# Patient Record
Sex: FEMALE | Race: WHITE | Employment: FULL TIME | URBAN - METROPOLITAN AREA
[De-identification: names, ages, dates, MRNs, and addresses within clinical notes are randomized per-mention and may not be internally consistent; named-entity substitution may affect disease eponyms.]

---

## 2017-10-28 ENCOUNTER — HOSPITAL ENCOUNTER (OUTPATIENT)
Dept: MAMMOGRAPHY | Age: 42
Discharge: HOME | End: 2017-10-28
Attending: OBSTETRICS & GYNECOLOGY
Payer: COMMERCIAL

## 2017-10-28 DIAGNOSIS — Z12.31 VISIT FOR SCREENING MAMMOGRAM: ICD-10-CM

## 2017-10-28 PROCEDURE — 77067 SCR MAMMO BI INCL CAD: CPT

## 2017-11-10 ENCOUNTER — DOCUMENTATION ONLY (OUTPATIENT)
Dept: SURGERY | Age: 42
End: 2017-11-10

## 2018-03-30 ENCOUNTER — HOSPITAL ENCOUNTER (OUTPATIENT)
Dept: GENERAL RADIOLOGY | Age: 43
Discharge: HOME | End: 2018-03-30
Payer: COMMERCIAL

## 2018-03-30 DIAGNOSIS — M25.521 PAIN IN RIGHT ELBOW: ICD-10-CM

## 2018-03-30 PROCEDURE — 73080 X-RAY EXAM OF ELBOW: CPT

## 2018-04-13 ENCOUNTER — OFFICE VISIT (OUTPATIENT)
Dept: ORTHOPEDIC SURGERY | Facility: CLINIC | Age: 43
End: 2018-04-13

## 2018-04-13 ENCOUNTER — APPOINTMENT (OUTPATIENT)
Dept: PHYSICAL THERAPY | Age: 43
End: 2018-04-13

## 2018-04-13 VITALS
OXYGEN SATURATION: 99 % | RESPIRATION RATE: 18 BRPM | HEIGHT: 60 IN | WEIGHT: 214.4 LBS | SYSTOLIC BLOOD PRESSURE: 115 MMHG | DIASTOLIC BLOOD PRESSURE: 73 MMHG | TEMPERATURE: 98.9 F | HEART RATE: 107 BPM | BODY MASS INDEX: 42.09 KG/M2

## 2018-04-13 DIAGNOSIS — M77.11 RIGHT LATERAL EPICONDYLITIS: Primary | ICD-10-CM

## 2018-04-13 DIAGNOSIS — M25.521 RIGHT ELBOW PAIN: ICD-10-CM

## 2018-04-13 PROBLEM — E66.01 OBESITY, MORBID (HCC): Status: ACTIVE | Noted: 2018-04-13

## 2018-04-13 RX ORDER — BUPIVACAINE HYDROCHLORIDE 2.5 MG/ML
4 INJECTION, SOLUTION EPIDURAL; INFILTRATION; INTRACAUDAL ONCE
Qty: 4 ML | Refills: 0
Start: 2018-04-13 | End: 2018-04-13

## 2018-04-13 RX ORDER — BETAMETHASONE SODIUM PHOSPHATE AND BETAMETHASONE ACETATE 3; 3 MG/ML; MG/ML
6 INJECTION, SUSPENSION INTRA-ARTICULAR; INTRALESIONAL; INTRAMUSCULAR; SOFT TISSUE ONCE
Qty: 0.5 ML | Refills: 0
Start: 2018-04-13 | End: 2018-04-13

## 2018-04-13 RX ORDER — LISINOPRIL AND HYDROCHLOROTHIAZIDE 20; 25 MG/1; MG/1
TABLET ORAL
Refills: 3 | COMMUNITY
Start: 2018-04-03

## 2018-04-13 NOTE — PATIENT INSTRUCTIONS
Tennis Elbow: Exercises  Your Care Instructions  Here are some examples of typical rehabilitation exercises for your condition. Start each exercise slowly. Ease off the exercise if you start to have pain. Your doctor or physical therapist will tell you when you can start these exercises and which ones will work best for you. How to do the exercises  Wrist flexor stretch    1. Extend your arm in front of you with your palm up. 2. Bend your wrist, pointing your hand toward the floor. 3. With your other hand, gently bend your wrist farther until you feel a mild to moderate stretch in your forearm. 4. Hold for at least 15 to 30 seconds. Repeat 2 to 4 times. Wrist extensor stretch    1. Repeat steps 1 to 4 of the stretch above but begin with your extended hand palm down. Ball or sock squeeze    1. Hold a tennis ball (or a rolled-up sock) in your hand. 2. Make a fist around the ball (or sock) and squeeze. 3. Hold for about 6 seconds, and then relax for up to 10 seconds. 4. Repeat 8 to 12 times. 5. Switch the ball (or sock) to your other hand and do 8 to 12 times. Wrist deviation    1. Sit so that your arm is supported but your hand hangs off the edge of a flat surface, such as a table. 2. Hold your hand out like you are shaking hands with someone. 3. Move your hand up and down. 4. Repeat this motion 8 to 12 times. 5. Switch arms. 6. Try to do this exercise twice with each hand. Wrist curls    1. Place your forearm on a table with your hand hanging over the edge of the table, palm up. 2. Place a 1- to 2-pound weight in your hand. This may be a dumbbell, a can of food, or a filled water bottle. 3. Slowly raise and lower the weight while keeping your forearm on the table and your palm facing up. 4. Repeat this motion 8 to 12 times. 5. Switch arms, and do steps 1 through 4.  6. Repeat with your hand facing down toward the floor. Switch arms. Biceps curls    1.  Sit leaning forward with your legs slightly spread and your left hand on your left thigh. 2. Place your right elbow on your right thigh, and hold the weight with your forearm horizontal.  3. Slowly curl the weight up and toward your chest.  4. Repeat this motion 8 to 12 times. 5. Switch arms, and do steps 1 through 4. Follow-up care is a key part of your treatment and safety. Be sure to make and go to all appointments, and call your doctor if you are having problems. It's also a good idea to know your test results and keep a list of the medicines you take. Where can you learn more? Go to http://linnea-luz.info/. Enter F324 in the search box to learn more about \"Tennis Elbow: Exercises. \"  Current as of: March 21, 2017  Content Version: 11.4  © 8458-3013 [employer]. Care instructions adapted under license by [employer] (which disclaims liability or warranty for this information). If you have questions about a medical condition or this instruction, always ask your healthcare professional. [place] [address] any warranty or liability for your use of this information. Joint Injections: Care Instructions  Your Care Instructions  Joint injections are shots into a joint, such as the knee. They may be used to put in medicines, such as pain relievers. Or they can be used to take out fluid. Sometimes the fluid is tested in a lab. This can help find the cause of a joint problem. A corticosteroid, or steroid, shot is used to reduce inflammation in tendons or joints. It is often used to treat problems such as arthritis, tendinitis, and bursitis. Steroids can be injected directly into a painful, inflamed joint. They can also help reduce inflammation of a bursa. A bursa is a sac of fluid. It cushions and lubricates areas where tendons, ligaments, skin, muscles, or bones rub against each other.   A steroid shot can sometimes help with short-term pain relief when other treatments haven't worked. If steroid shots help, pain may improve for weeks or months. Follow-up care is a key part of your treatment and safety. Be sure to make and go to all appointments, and call your doctor if you are having problems. It's also a good idea to know your test results and keep a list of the medicines you take. How can you care for yourself at home? · Put ice or a cold pack on the area for 10 to 20 minutes at a time. Put a thin cloth between the ice and your skin. · Take anti-inflammatory medicines to reduce pain, swelling, or inflammation. These include ibuprofen (Advil, Motrin) and naproxen (Aleve). Read and follow all instructions on the label. · Avoid strenuous activities for several days, especially those that put stress on the area where you got the shot. · If you have dressings over the area, keep them clean and dry. You may remove them when your doctor tells you to. When should you call for help? Call your doctor now or seek immediate medical care if:  ? · You have signs of infection, such as:  ¨ Increased pain, swelling, warmth, or redness. ¨ Red streaks leading from the site. ¨ Pus draining from the site. ¨ A fever. ? Watch closely for changes in your health, and be sure to contact your doctor if you have any problems. Where can you learn more? Go to http://linnea-luz.info/. Enter N616 in the search box to learn more about \"Joint Injections: Care Instructions. \"  Current as of: March 21, 2017  Content Version: 11.4  © 1319-0643 [employer]. Care instructions adapted under license by [employer] (which disclaims liability or warranty for this information). If you have questions about a medical condition or this instruction, always ask your healthcare professional. [address] any warranty or liability for your use of this information.

## 2018-04-13 NOTE — MR AVS SNAPSHOT
[address], [address] [place] [address] 
[phone] Patient: Krystin Cruz MRN: [id number] :1975 Visit Information Date & Time Provider Department Dept. Phone Encounter #  
 2018  2:15 PM Lexy Miranda, 27 [facility] [phone] [id number] Follow-up Instructions Return if symptoms worsen or fail to improve. Upcoming Health Maintenance Date Due DTaP/Tdap/Td series (1 - Tdap) 1998 PAP AKA CERVICAL CYTOLOGY 1998 Influenza Age 5 to Adult 2017 Allergies as of 2018  Review Complete On: 2018 By: Casey Lopez Severity Noted Reaction Type Reactions Zegerid [Omeprazole-sodium Bicarbonate] Medium 2017    Swelling Hands and feet Current Immunizations  Never Reviewed No immunizations on file. Not reviewed this visit You Were Diagnosed With   
  
 Codes Comments Right lateral epicondylitis    -  Primary ICD-10-CM: M77.11 ICD-9-CM: 726.32 Right elbow pain     ICD-10-CM: M25.521 ICD-9-CM: 719.42 BMI 40.0-44.9, adult [facility])     ICD-10-CM: Z68.41 
ICD-9-CM: V85.41 Vitals BP Pulse Temp Resp Height(growth percentile) Weight(growth percentile) 115/73 (!) 107 98.9 °F (37.2 °C) (Oral) 18 5' (1.524 m) 214 lb 6.4 oz (97.3 kg) SpO2 BMI Smoking Status 99% 41.87 kg/m2 Former Smoker BMI and BSA Data Body Mass Index Body Surface Area  
 41.87 kg/m 2 2.03 m 2 Preferred Pharmacy Pharmacy Name Phone [facility] [address] [phone] Your Updated Medication List  
  
   
This list is accurate as of 18  3:15 PM.  Always use your most recent med list.  
  
  
  
  
 albuterol 90 mcg/actuation inhaler Commonly known as:  PROVENTIL HFA, VENTOLIN HFA, PROAIR HFA  
 Take 2 Puffs by inhalation every four (4) hours as needed for Wheezing. * ibuprofen 800 mg tablet Commonly known as:  MOTRIN Take 800 mg by mouth every eight (8) hours as needed for Pain. * ibuprofen 800 mg tablet Commonly known as:  MOTRIN Take 1 Tab by mouth every eight (8) hours as needed for Pain. lisinopril-hydroCHLOROthiazide 20-25 mg per tablet Commonly known as:  Sigifredo Pelt TK 1 T PO QD  
  
 meclizine 25 mg tablet Commonly known as:  ANTIVERT Take 25 mg by mouth three (3) times daily as needed. meloxicam 15 mg tablet Commonly known as:  MOBIC Take 15 mg by mouth nightly. oxyCODONE-acetaminophen 5-325 mg per tablet Commonly known as:  PERCOCET Take 1 Tab by mouth every four (4) hours as needed for Pain. Max Daily Amount: 6 Tabs. phentermine 30 mg capsule Take 30 mg by mouth every morning. SINGULAIR 10 mg tablet Generic drug:  montelukast  
Take 10 mg by mouth nightly. TOPAMAX 50 mg tablet Generic drug:  topiramate Take 50 mg by mouth daily. * Notice: This list has 2 medication(s) that are the same as other medications prescribed for you. Read the directions carefully, and ask your doctor or other care provider to review them with you. Follow-up Instructions Return if symptoms worsen or fail to improve. To-Do List   
 04/20/2018 3:00 PM  
  Appointment with Richard Arceo PT at [facility] [address] [place] [address] ([phone]) Patient Instructions Tennis Elbow: Exercises Your Care Instructions Here are some examples of typical rehabilitation exercises for your condition. Start each exercise slowly. Ease off the exercise if you start to have pain. Your doctor or physical therapist will tell you when you can start these exercises and which ones will work best for you. How to do the exercises Wrist flexor stretch 1. Extend your arm in front of you with your palm up. 2. Bend your wrist, pointing your hand toward the floor. 3. With your other hand, gently bend your wrist farther until you feel a mild to moderate stretch in your forearm. 4. Hold for at least 15 to 30 seconds. Repeat 2 to 4 times. Wrist extensor stretch 1. Repeat steps 1 to 4 of the stretch above but begin with your extended hand palm down. Ball or sock squeeze 1. Hold a tennis ball (or a rolled-up sock) in your hand. 2. Make a fist around the ball (or sock) and squeeze. 3. Hold for about 6 seconds, and then relax for up to 10 seconds. 4. Repeat 8 to 12 times. 5. Switch the ball (or sock) to your other hand and do 8 to 12 times. Wrist deviation 1. Sit so that your arm is supported but your hand hangs off the edge of a flat surface, such as a table. 2. Hold your hand out like you are shaking hands with someone. 3. Move your hand up and down. 4. Repeat this motion 8 to 12 times. 5. Switch arms. 6. Try to do this exercise twice with each hand. Wrist curls 1. Place your forearm on a table with your hand hanging over the edge of the table, palm up. 2. Place a 1- to 2-pound weight in your hand. This may be a dumbbell, a can of food, or a filled water bottle. 3. Slowly raise and lower the weight while keeping your forearm on the table and your palm facing up. 4. Repeat this motion 8 to 12 times. 5. Switch arms, and do steps 1 through 4. 
6. Repeat with your hand facing down toward the floor. Switch arms. Biceps curls 1. Sit leaning forward with your legs slightly spread and your left hand on your left thigh. 2. Place your right elbow on your right thigh, and hold the weight with your forearm horizontal. 
3. Slowly curl the weight up and toward your chest. 
4. Repeat this motion 8 to 12 times. 5. Switch arms, and do steps 1 through 4. Follow-up care is a key part of your treatment and safety.  Be sure to make and go to all appointments, and call your doctor if you are having problems. It's also a good idea to know your test results and keep a list of the medicines you take. Where can you learn more? Go to http://linnea-luz.info/. Enter O626 in the search box to learn more about \"Tennis Elbow: Exercises. \" Current as of: March 21, 2017 Content Version: 11.4 © 3091-2957 [employer]. Care instructions adapted under license by [employer] (which disclaims liability or warranty for this information). If you have questions about a medical condition or this instruction, always ask your healthcare professional. [place] [address] any warranty or liability for your use of this information. Joint Injections: Care Instructions Your Care Instructions Joint injections are shots into a joint, such as the knee. They may be used to put in medicines, such as pain relievers. Or they can be used to take out fluid. Sometimes the fluid is tested in a lab. This can help find the cause of a joint problem. A corticosteroid, or steroid, shot is used to reduce inflammation in tendons or joints. It is often used to treat problems such as arthritis, tendinitis, and bursitis. Steroids can be injected directly into a painful, inflamed joint. They can also help reduce inflammation of a bursa. A bursa is a sac of fluid. It cushions and lubricates areas where tendons, ligaments, skin, muscles, or bones rub against each other. A steroid shot can sometimes help with short-term pain relief when other treatments haven't worked. If steroid shots help, pain may improve for weeks or months. Follow-up care is a key part of your treatment and safety. Be sure to make and go to all appointments, and call your doctor if you are having problems. It's also a good idea to know your test results and keep a list of the medicines you take. How can you care for yourself at home? · Put ice or a cold pack on the area for 10 to 20 minutes at a time.  Put a thin cloth between the ice and your skin. · Take anti-inflammatory medicines to reduce pain, swelling, or inflammation. These include ibuprofen (Advil, Motrin) and naproxen (Aleve). Read and follow all instructions on the label. · Avoid strenuous activities for several days, especially those that put stress on the area where you got the shot. · If you have dressings over the area, keep them clean and dry. You may remove them when your doctor tells you to. When should you call for help? Call your doctor now or seek immediate medical care if: 
? · You have signs of infection, such as: 
¨ Increased pain, swelling, warmth, or redness. ¨ Red streaks leading from the site. ¨ Pus draining from the site. ¨ A fever. ? Watch closely for changes in your health, and be sure to contact your doctor if you have any problems. Where can you learn more? Go to http://linnea-luz.info/. Enter N616 in the search box to learn more about \"Joint Injections: Care Instructions. \" Current as of: March 21, 2017 Content Version: 11.4 © 0801-9447 [employer]. Care instructions adapted under license by [employer] (which disclaims liability or warranty for this information). If you have questions about a medical condition or this instruction, always ask your healthcare professional. [address] any warranty or liability for your use of this information. Introducing [facility]! Dear Elisabeth Kebeed: 
Thank you for requesting a [employer] account. Our records indicate that you already have an active [employer] account. You can access your account anytime at [email or web address] Did you know that you can access your hospital and ER discharge instructions at any time in [employer]? You can also review all of your test results from your hospital stay or ER visit. Additional Information If you have questions, please visit the Frequently Asked Questions section of the [employer] [facility] [employer] website at [email or web address] Remember, [employer] is NOT to be used for urgent needs. For medical emergencies, dial 911. Now available from your iPhone and Android! Please provide this summary of care documentation to your next provider. Your primary care clinician is listed as DEV SORIA. If you have any questions after today's visit, please call [phone].

## 2018-04-13 NOTE — PROGRESS NOTES
Patient: Nilesh Finnegan                MRN: [id number]       SSN: [id number]  YOB: 1975        AGE: 38 y.o. SEX: female    PCP: Jerardo Barcenas MD  04/13/18    Chief Complaint   Patient presents with    Elbow Pain     Right     HISTORY:  Nilesh Finnegan is a 38 y.o. female who is seen for right elbow pain. She reports pain for the past month with no history of injury. She states her pain comes and goes. She notes at time she has shooting pains. She reports that she aggravated her condition about a month and a half ago when she caught herself from falling on the ice while walking into work. She states that she has pain when pronating her arm to type at a keyboard. Pain Assessment  4/13/2018   Location of Pain Knee   Location Modifiers Right   Severity of Pain 9   Quality of Pain Dull;Aching; Leveda Landau; Throbbing   Duration of Pain Persistent   Frequency of Pain Constant   Aggravating Factors Bending;Stretching;Straightening   Limiting Behavior Yes   Relieving Factors Other (Comment); Heat   Relieving Factors Comment massage   Result of Injury No     Occupation, etc:  Ms. 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 She lives in [place] with her 5year old daughter, adult friend that watches her daughter, and her two adult children- a 45year old son and 55year old duaghter. Current weight is 214 pounds. She is 5' tall. She is not diabetic. She is hypertensive. No results found for: HBA1C, HGBE8, PJP5PMSQ, DCU2ZQUR, NOT2OKLJ  Weight Metrics 4/13/2018 11/14/2017 11/9/2017 9/6/2015 9/5/2015   Weight 214 lb 6.4 oz 200 lb 9.9 oz 203 lb - 220 lb   BMI 41.87 kg/m2 38.22 kg/m2 38.67 kg/m2 41.59 kg/m2 -       There is no problem list on file for this patient. REVIEW OF SYSTEMS: All Below are Negative except: See HPI   Constitutional: negative for fever, chills, and weight loss.    Cardiovascular: negative for chest pain, claudication, leg swelling, SOB, VAZQUEZ   Gastrointestinal: Negative for pain, N/V/C/D, Blood in stool or urine, dysuria,  hematuria, incontinence, pelvic pain. Musculoskeletal: See HPI   Neurological: Negative for dizziness and weakness. Negative for headaches, Visual changes, confusion, seizures   Phychiatric/Behavioral: Negative for depression, memory loss, substance  abuse. Extremities: Negative for hair changes, rash, or skin lesion changes. Hematologic: Negative for bleeding problems, bruising, pallor or swollen lymph  nodes   Peripheral Vascular: No calf pain, no circulation deficits. Social History     Social History    Marital status: LEGALLY      Spouse name: N/A    Number of children: N/A    Years of education: N/A     Occupational History    Not on file. Social History Main Topics    Smoking status: Former Smoker     Quit date: 11/9/2003    Smokeless tobacco: Never Used    Alcohol use Yes      Comment: socially    Drug use: No    Sexual activity: Not on file     Other Topics Concern    Not on file     Social History Narrative      Allergies   Allergen Reactions    Zegerid [Omeprazole-Sodium Bicarbonate] Swelling     Hands and feet      Current Outpatient Prescriptions   Medication Sig    lisinopril-hydroCHLOROthiazide (PRINZIDE, ZESTORETIC) 20-25 mg per tablet TK 1 T PO QD    montelukast (SINGULAIR) 10 mg tablet Take 10 mg by mouth nightly.  meloxicam (MOBIC) 15 mg tablet Take 15 mg by mouth nightly.  oxyCODONE-acetaminophen (PERCOCET) 5-325 mg per tablet Take 1 Tab by mouth every four (4) hours as needed for Pain. Max Daily Amount: 6 Tabs.  ibuprofen (MOTRIN) 800 mg tablet Take 1 Tab by mouth every eight (8) hours as needed for Pain.  topiramate (TOPAMAX) 50 mg tablet Take 50 mg by mouth daily.  phentermine 30 mg capsule Take 30 mg by mouth every morning.  ibuprofen (MOTRIN) 800 mg tablet Take 800 mg by mouth every eight (8) hours as needed for Pain.     meclizine (ANTIVERT) 25 mg tablet Take 25 mg by mouth three (3) times daily as needed.  albuterol (PROVENTIL HFA, VENTOLIN HFA, PROAIR HFA) 90 mcg/actuation inhaler Take 2 Puffs by inhalation every four (4) hours as needed for Wheezing. No current facility-administered medications for this visit. PHYSICAL EXAMINATION:  Visit Vitals    /73    Pulse (!) 107    Temp 98.9 °F (37.2 °C) (Oral)    Resp 18    Ht 5' (1.524 m)    Wt 214 lb 6.4 oz (97.3 kg)    SpO2 99%    BMI 41.87 kg/m2      ORTHO EXAMINATION:  Examination Right Elbow Left Elbow   Skin Intact Intact   Range of Motion 130-0 135-0   Tenderness + lateral epicondyle -   Swelling - -   Bruising - -   Stability Normal Normal   Motor Strength  Normal Normal   Neurovascular Intact Intact   80/80 pronation/supination    PROCEDURE:  After discussing treatment options, patient's right lateral epicondyle region was injected with 4 cc Marcaine and 1/2 cc Celestone. Chart reviewed for the following:   Tami Crowe MD, have reviewed the History, Physical and updated the Allergic reactions for [address] [place] [address] performed immediately prior to start of procedure:  Tami Crowe MD, have performed the following reviews on Ligia Mcarthur prior to the start of the procedure:            * Patient was identified by name and date of birth   * Agreement on procedure being performed was verified  * Risks and Benefits explained to the patient  * Procedure site verified and marked as necessary  * Patient was positioned for comfort  * Consent was obtained     Time: 3:11 PM     Date of procedure: 4/13/2018    Procedure performed by:  Luis Michaels MD    Ms. Princess Contreras tolerated the procedure well with no complications. RADIOGRAPHS:  XR RIGHT ELBOW 3/30/18  IMPRESSION:   No acute findings. Tiny coronoid process spur. IMPRESSION:      ICD-10-CM ICD-9-CM    1.  Right lateral epicondylitis M77.11 726.32 betamethasone (CELESTONE SOLUSPAN) 6 mg/mL injection BETAMETHASONE ACETATE & SODIUM PHOSPHATE INJECTION 3 MG EA.      bupivacaine, PF, (MARCAINE, PF,) 0.25 % (2.5 mg/mL) injection      OK INJECT TENDON SHEATH/LIGAMENT      AMB SUPPLY ORDER      REFERRAL TO OCCUPATIONAL THERAPY   2. Right elbow pain M25.521 719.42 betamethasone (CELESTONE SOLUSPAN) 6 mg/mL injection      BETAMETHASONE ACETATE & SODIUM PHOSPHATE INJECTION 3 MG EA.      bupivacaine, PF, (MARCAINE, PF,) 0.25 % (2.5 mg/mL) injection      OK INJECT TENDON SHEATH/LIGAMENT      AMB SUPPLY ORDER      REFERRAL TO OCCUPATIONAL THERAPY   3. BMI 40.0-44.9, adult ([facility] [address] Z68.41 V85.41      PLAN:  After discussing treatment options, patient's right lateral epicondylar region was injected with 4 cc Marcaine and 1/2 cc Celestone. Gloria Lemus She will follow up as needed. She was provided with a tennis elbow strap today. She will begin a short course of outpatient physical therapy for her right elbow.      Scribed by Zenaida Givens ([address]) as dictated by Chad Gutiérrez MD

## 2018-04-20 ENCOUNTER — HOSPITAL ENCOUNTER (OUTPATIENT)
Dept: PHYSICAL THERAPY | Age: 43
End: 2018-04-20

## 2018-04-25 ENCOUNTER — APPOINTMENT (OUTPATIENT)
Dept: PHYSICAL THERAPY | Age: 43
End: 2018-04-25

## 2018-05-03 ENCOUNTER — OFFICE VISIT (OUTPATIENT)
Dept: CARDIOLOGY CLINIC | Age: 43
End: 2018-05-03

## 2018-05-03 VITALS
SYSTOLIC BLOOD PRESSURE: 118 MMHG | BODY MASS INDEX: 42.6 KG/M2 | HEIGHT: 60 IN | HEART RATE: 91 BPM | OXYGEN SATURATION: 98 % | DIASTOLIC BLOOD PRESSURE: 78 MMHG | WEIGHT: 217 LBS

## 2018-05-03 DIAGNOSIS — R06.02 SOB (SHORTNESS OF BREATH): ICD-10-CM

## 2018-05-03 DIAGNOSIS — E66.01 OBESITY, MORBID (HCC): ICD-10-CM

## 2018-05-03 DIAGNOSIS — R00.2 PALPITATIONS: Primary | ICD-10-CM

## 2018-05-03 DIAGNOSIS — I10 ESSENTIAL HYPERTENSION: ICD-10-CM

## 2018-05-03 RX ORDER — PREDNISONE 5 MG/1
TABLET ORAL SEE ADMIN INSTRUCTIONS
COMMUNITY

## 2018-05-03 NOTE — PROGRESS NOTES
HISTORY OF PRESENT ILLNESS  Louis Wilks is a 38 y.o. female. HPI    Patient presents for a new office visit. She was referred here by her PCP for evaluation of heart palpitations. The patient has a history of essential hypertension which was recently diagnosed. She states she was started on blood pressure medication less than 2 months ago. She also has a history of intermittent asthma and obesity. She reports a gradual 20-30 pound weight gain over the past several years. The patient states her heart palpitations started approximately 5-6 months ago. She initially thought this was related to phentermine, which she has started for weight loss. However she stopped taking the medications and her palpitations did not improve. She states the symptoms occur daily lasting for anywhere from a few minutes to 20 minutes in duration. Feels like her heart is racing and skipping beats. He has checked her heart rate on her phone and it read 115 bpm.  She denies any associated syncope or near syncope. He also has noted exertional dyspnea over the past year which has not particularly worsened. She did have leg swelling, but this improved when she started taking a thiazide diuretic for blood pressure. No orthopnea, no PND. No chest pain or pressure. No major change in her activity tolerance. Past Medical History:   Diagnosis Date    Abdominal cramping, periumbilical     Back pain     Bilateral plantar fasciitis     Endometriosis     Hypertension 2018    Morbid obesity (HCC)     Vertigo      Current Outpatient Prescriptions   Medication Sig Dispense Refill    predniSONE (STERAPRED) 5 mg dose pack Take  by mouth See Admin Instructions. See administration instruction per 5mg dose pack      lisinopril-hydroCHLOROthiazide (PRINZIDE, ZESTORETIC) 20-25 mg per tablet TK 1 T PO QD  3    ibuprofen (MOTRIN) 800 mg tablet Take 1 Tab by mouth every eight (8) hours as needed for Pain.  60 Tab 0    montelukast (SINGULAIR) 10 mg tablet Take 10 mg by mouth nightly.  meclizine (ANTIVERT) 25 mg tablet Take 25 mg by mouth three (3) times daily as needed.  albuterol (PROVENTIL HFA, VENTOLIN HFA, PROAIR HFA) 90 mcg/actuation inhaler Take 2 Puffs by inhalation every four (4) hours as needed for Wheezing. Allergies   Allergen Reactions    Zegerid [Omeprazole-Sodium Bicarbonate] Swelling     Hands and feet      Social History   Substance Use Topics    Smoking status: Former Smoker     Quit date: 11/9/2003    Smokeless tobacco: Never Used    Alcohol use Yes      Comment: socially     Family History   Problem Relation Age of Onset    Breast Cancer Sister 28    Cancer Sister      breast and uterine    Diabetes Mother     Hypertension Mother     Cancer Maternal Uncle      lung and bone     Cancer Maternal Grandmother      breast    Cancer Cousin      breast         Review of Systems   Constitutional: Negative for chills, fever and weight loss. HENT: Negative for nosebleeds. Eyes: Negative for blurred vision and double vision. Respiratory: Positive for shortness of breath. Negative for cough and wheezing. Cardiovascular: Positive for palpitations. Negative for chest pain, orthopnea, claudication, leg swelling and PND. Gastrointestinal: Negative for abdominal pain, heartburn, nausea and vomiting. Genitourinary: Negative for dysuria and hematuria. Musculoskeletal: Negative for falls and myalgias. Skin: Negative for rash. Neurological: Negative for dizziness, focal weakness and headaches. Endo/Heme/Allergies: Does not bruise/bleed easily. Psychiatric/Behavioral: Negative for substance abuse. Visit Vitals    /78    Pulse 91    Ht 5' (1.524 m)    Wt 98.4 kg (217 lb)    SpO2 98%    BMI 42.38 kg/m2       Physical Exam   Constitutional: She is oriented to person, place, and time. She appears well-developed and well-nourished. HENT:   Head: Normocephalic and atraumatic.    Eyes: Conjunctivae are normal.   Neck: Neck supple. No JVD present. Carotid bruit is not present. Cardiovascular: Normal rate, regular rhythm, S1 normal, S2 normal and normal pulses. Exam reveals no gallop and no friction rub. No murmur heard. Pulmonary/Chest: Effort normal and breath sounds normal. She has no wheezes. She has no rales. Abdominal: Soft. Bowel sounds are normal. There is no tenderness. Musculoskeletal: She exhibits no edema, tenderness or deformity. Neurological: She is alert and oriented to person, place, and time. Skin: Skin is warm and dry. Psychiatric: She has a normal mood and affect. Her behavior is normal. Thought content normal.     EKG: Normal sinus rhythm, normal axis, normal QTc interval, no ST/T wave abnormalities concerning for ischemia. ASSESSMENT and PLAN  Encounter Diagnoses   Name Primary?  Palpitations Yes    SOB (shortness of breath)     Essential hypertension     Obesity, morbid (HCC)      Heart palpitations. Unclear etiology at this point. Since her symptoms occurring daily, I recommended a 48 hour Holter monitor for further evaluation. Would also like to rule out any structural heart disease with an echocardiogram.    Dyspnea on exertion. My suspicion  present this is all related to her body habitus and deconditioning. However, a cardiac etiology will be evaluated for with an echocardiogram.    Essential hypertension. This was recently diagnosed earlier this year. He is now taking lisinopril/HCTZ and her blood pressure appears to be adequately controlled on this regimen. Morbid obesity. Patient's weight has gradually increased over the past 2-3 years. She was encouraged to try lose as much weight as possible with lifestyle modification.     Follow-up in 2-3 months, sooner if needed

## 2018-05-03 NOTE — MR AVS SNAPSHOT
[address] [address] [address] [address] [phone] Patient: Arnulfo Madera MRN: [id number] :1975 Visit Information Date & Time Provider Department Dept. Phone Encounter #  
 5/3/2018 10:00 AM Lauryn Arnold MD [facility] [id number] Follow-up Instructions Return in about 3 months (around 8/3/2018). Upcoming Health Maintenance Date Due DTaP/Tdap/Td series (1 - Tdap) 1998 PAP AKA CERVICAL CYTOLOGY 1998 Influenza Age 5 to Adult 2018 Allergies as of 5/3/2018  Review Complete On: 2018 By: Conchita Clark MD  
  
 Severity Noted Reaction Type Reactions Zegerid [Omeprazole-sodium Bicarbonate] Medium 2017    Swelling Hands and feet Current Immunizations  Never Reviewed No immunizations on file. Not reviewed this visit You Were Diagnosed With   
  
 Codes Comments Palpitations    -  Primary ICD-10-CM: R00.2 ICD-9-CM: 785.1 SOB (shortness of breath)     ICD-10-CM: R06.02 
ICD-9-CM: 786.05 Vitals BP Pulse Height(growth percentile) Weight(growth percentile) SpO2 BMI  
 118/78 91 5' (1.524 m) 217 lb (98.4 kg) 98% 42.38 kg/m2 Smoking Status Former Smoker Vitals History BMI and BSA Data Body Mass Index Body Surface Area  
 42.38 kg/m 2 2.04 m 2 Preferred Pharmacy Pharmacy Name Phone [facility] [address] [place] [address] [phone] Your Updated Medication List  
  
   
This list is accurate as of 5/3/18 10:55 AM.  Always use your most recent med list.  
  
  
  
  
 albuterol 90 mcg/actuation inhaler Commonly known as:  PROVENTIL HFA, VENTOLIN HFA, PROAIR HFA Take 2 Puffs by inhalation every four (4) hours as needed for Wheezing. ibuprofen 800 mg tablet Commonly known as:  MOTRIN Take 1 Tab by mouth every eight (8) hours as needed for Pain. lisinopril-hydroCHLOROthiazide 20-25 mg per tablet Commonly known as:  Terryl Range TK 1 T PO QD  
  
 meclizine 25 mg tablet Commonly known as:  ANTIVERT Take 25 mg by mouth three (3) times daily as needed. predniSONE 5 mg dose pack Commonly known as:  STERAPRED Take  by mouth See Admin Instructions. See administration instruction per 5mg dose pack SINGULAIR 10 mg tablet Generic drug:  montelukast  
Take 10 mg by mouth nightly. We Performed the Following AMB POC EKG ROUTINE W/ 12 LEADS, INTER & REP [09338 CPT(R)] Follow-up Instructions Return in about 3 months (around 8/3/2018). To-Do List   
 05/03/2018 ECHO:  2D ECHO COMPLETE ADULT (TTE) W OR WO CONTR   
  
 05/03/2018 ECG:  ECG HOLTER MONITOR, UP TO 48 HRS Introducing [facility]! Dear Rocio Ashley: 
Thank you for requesting a [employer] account. Our records indicate that you already have an active [employer] account. You can access your account anytime at [email or web address] Did you know that you can access your hospital and ER discharge instructions at any time in [employer]? You can also review all of your test results from your hospital stay or ER visit. Additional Information If you have questions, please visit the Frequently Asked Questions section of the [employer] website at [email or web address] Remember, [employer] is NOT to be used for urgent needs. For medical emergencies, dial 911. Now available from your iPhone and Android! Please provide this summary of care documentation to your next provider. Your primary care clinician is listed as DEV SORIA. If you have any questions after today's visit, please call [phone].

## 2018-05-16 ENCOUNTER — HOSPITAL ENCOUNTER (OUTPATIENT)
Dept: NON INVASIVE DIAGNOSTICS | Age: 43
Discharge: HOME | End: 2018-05-16
Attending: INTERNAL MEDICINE
Payer: COMMERCIAL

## 2018-05-16 DIAGNOSIS — R00.2 PALPITATIONS: ICD-10-CM

## 2018-05-16 DIAGNOSIS — R06.02 SOB (SHORTNESS OF BREATH): ICD-10-CM

## 2018-05-16 PROCEDURE — 93306 TTE W/DOPPLER COMPLETE: CPT

## 2018-05-16 PROCEDURE — 93225 XTRNL ECG REC<48 HRS REC: CPT | Performed by: INTERNAL MEDICINE

## 2018-05-17 NOTE — PROGRESS NOTES
Per your last note \" Dyspnea on exertion. My suspicion  present this is all related to her body habitus and deconditioning.   However, a cardiac etiology will be evaluated for with an echocardiogram. Waiting on holter results

## 2018-05-25 ENCOUNTER — TELEPHONE (OUTPATIENT)
Dept: ORTHOPEDIC SURGERY | Age: 43
End: 2018-05-25

## 2018-05-25 ENCOUNTER — DOCUMENTATION ONLY (OUTPATIENT)
Dept: ORTHOPEDIC SURGERY | Facility: CLINIC | Age: 43
End: 2018-05-25

## 2018-05-25 DIAGNOSIS — M25.521 RIGHT ELBOW PAIN: Primary | ICD-10-CM

## 2018-05-25 DIAGNOSIS — M77.11 RIGHT LATERAL EPICONDYLITIS: ICD-10-CM

## 2018-05-25 NOTE — TELEPHONE ENCOUNTER
Patient is calling requesting Dr. Opal Leary re-order O. T.as patient was unable to start when Lynita Standard was entered due to work schedule (works for [employer]) Please refax OT order to [facility]. [facility] fax [phone] so they can call her to schedule appts. Please call patient at [phone]. Also patient needs a letter from Dr. Opal Leary ref her mother, Mrs. Jv Bhatia 10/20/1952, that Mrs. Connie Alex is not able to take care of her self and need her daughter Mrs. Rajput to be off to take care of her mother's personal needs and appts. Pateint needs letter for her HR as soon as possible. Please call Mrs. Ave Mckinley at [phone]

## 2018-05-25 NOTE — PROGRESS NOTES
Patient dropped off FMLA paperwork to be completed . She stated that she is needing [facility] to care for her mother. Please advise patient at [phone] when ready for pick-up.

## 2018-05-25 NOTE — TELEPHONE ENCOUNTER
OK per FLAKITO Jones to re-order OT--order written--called pt --advised her we need FMLA form for care of family member--she already had and will drop by HS location--she needs for intermittent leave

## 2018-05-29 ENCOUNTER — DOCUMENTATION ONLY (OUTPATIENT)
Dept: ORTHOPEDIC SURGERY | Facility: CLINIC | Age: 43
End: 2018-05-29

## 2018-06-04 NOTE — PROGRESS NOTES
Per your last note \" Heart palpitations. Unclear etiology at this point. Since her symptoms occurring daily, I recommended a 48 hour Holter monitor for further evaluation.   Would also like to rule out any structural heart disease with an echocardiogram.

## 2018-06-05 ENCOUNTER — TELEPHONE (OUTPATIENT)
Dept: CARDIOLOGY CLINIC | Age: 43
End: 2018-06-05

## 2018-06-05 NOTE — TELEPHONE ENCOUNTER
Call and left message for patient to give office a call to inform patient of Holter monitor results.

## 2018-06-05 NOTE — TELEPHONE ENCOUNTER
----- Message from Munir Cohn MD sent at 6/4/2018  2:20 PM EDT -----  Please let the patient know that her Holter monitor was unremarkable.  ----- Message -----     From: Dayana Driver RN     Sent: 6/4/2018   8:56 AM       To: Munir Cohn MD    Per your last note \" Heart palpitations. Unclear etiology at this point. Since her symptoms occurring daily, I recommended a 48 hour Holter monitor for further evaluation.   Would also like to rule out any structural heart disease with an echocardiogram.

## 2018-06-06 ENCOUNTER — HOSPITAL ENCOUNTER (OUTPATIENT)
Dept: PHYSICAL THERAPY | Age: 43
Discharge: HOME | End: 2018-06-06
Payer: COMMERCIAL

## 2018-06-06 PROCEDURE — 97110 THERAPEUTIC EXERCISES: CPT

## 2018-06-06 PROCEDURE — 97165 OT EVAL LOW COMPLEX 30 MIN: CPT

## 2018-06-06 NOTE — PROGRESS NOTES
OT DAILY TREATMENT NOTE  3-    Patient Name: Stephanie Trevino  Date:2018  : 1975  [x]  Patient  Verified  Payor: Caleb Rather / Plan: [place] [employer] / Product Type: HMO /    In time: 56 Out time:*400  Total Treatment Time (min): 50  Visit #: 1 of 10    Treatment Area: Pain in right elbow [M25.521]  Lateral epicondylitis, right elbow [M77.11]    SUBJECTIVE  Pain Level (0-10 scale): 510  Any medication changes, allergies to medications, adverse drug reactions, diagnosis change, or new procedure performed?: [x] No    [] Yes (see summary sheet for update)  Subjective functional status/changes:   [] No changes reported  Felt better after injection for a few months    OBJECTIVE    Modality rationale: decrease pain and increase tissue extensibility to improve the patients ability to flex extend elbow without pain   Min Type Additional Details    [] Estim:  []Unatt       []IFC  []Premod                        []Other:  []w/ice   []w/heat  Position:  Location:    [] Estim: []Att    []TENS instruct  []NMES                    []Other:  []w/US   []w/ice   []w/heat  Position:  Location:    []  Traction: [] Cervical       []Lumbar                       [] Prone          []Supine                       []Intermittent   []Continuous Lbs:  [] before manual  [] after manual    []  Ultrasound: []Continuous   [] Pulsed                           []1MHz   []3MHz W/cm2:  Location:    []  Iontophoresis with dexamethasone         Location: [] Take home patch   [] In clinic   5 []  Ice     [x]  heat  []  Ice massage  []  Laser   []  Anodyne Position:  Location:elbow    []  Laser with stim  []  Other:  Position:  Location:    []  Vasopneumatic Device Pressure:       [] lo [] med [] hi   Temperature: [] lo [] med [] hi   [x] Skin assessment post-treatment:  []intact []redness- no adverse reaction    []redness  adverse reaction:     10 min Therapeutic Exercise:  [] See flow sheet :   Rationale: increase ROM to improve the patients ability to move arm freely  Review of phase Ii 10 reps each    5 min Manual Therapy:  Massage   Rationale: decrease pain to improve ROM  Review of phase I massage  *    With   [] TE   [] TA   [] neuro   [] other: Patient Education: [x] Review HEP    [] Progressed/Changed HEP based on: Phase II  [] positioning   [] body mechanics   [] transfers   [] heat/ice application   [x] Splint wear/care   [] Sensory re-education   [] scar management      [] other:             Other Objective/Functional Measures:   Subjective: pt is a right hand dominant, 42 y.o.y/o, female who sustained his/her injury 3/12/18 Injection April 13 with relief x 2 months   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        Objective:    Range of Motion:WNL  Strength:Pain with resistive wrist extension    Right Left   Shoulder Flex      Ext 3+ 5    abd      Horizontal add      IR      ER     Elbow Ext/flex     Forearm Supination      Pronation     Wrist Flex      Ext pain 5    Ulnar Dev      Radial Dev       Hand ROM WNL  Hand Strength:   Gross Grasp 3pt Pinch Lateral Pinch Tip Pinch   Right  30  10 strss 15 11 10   Left 60  55 stress 18 16 10       Finger Opposition:WNL      Palpation: Pain forearm 3 in distal to crease to 3 inches above elbow on triceps    ADLs I with pain and difficulty lifting opening etc  Light Meal Prep:    []MaxA   []ModA   []Alisson   [] CGA   []SBA   []Sangeeta   []Independent  Household/Other: []MaxA   []ModA   []Alisson   [] CGA   []SBA   []Sangeeta   []Independent    [] CGA   []SBA   []Sangeeta   []Independent       Pain Level (0-10 scale) post treatment: 4/10    ASSESSMENT/Changes in Function: *   [x]  See Plan of Care  []  See progress note/recertification  []  See Discharge Summary             PLAN  []  Upgrade activities as tolerated     []  Continue plan of care  []  Update interventions per flow sheet       []  Discharge due to:_  []  Other:_      Ly Villegas OTR/L 6/6/2018  3:02 PM    Future Appointments  Date Time Provider Susan Waller   7/27/2018 3:20 PM Narcisa Perez MD [address] [place] [address]

## 2018-06-06 NOTE — PROGRESS NOTES
[facility] [place] [employer]  [place]  [address] [place] [address]  [phone] fax    Plan of Care/Statement of Necessity for Occupational Therapy Services    Patient name: Tammy Mir Start of Care: 2018   Referral source: Sherie Martinez MD : 1975    Medical Diagnosis: Pain in right elbow [M25.521]  Lateral epicondylitis, right elbow [M77.11]   Onset Date:3/12/18    Treatment Diagnosis: Pain right elbow   Prior Hospitalization: see medical history Provider#: [id number]   Medications: Verified on Patient summary List    Comorbidities: Anxiety, HTN, prior right elbow injury   Prior Level of Function: I self care home care driving, working out, family activities with 4 children, works at [employer] [address] [employer] and following information is based on the information from the initial evaluation. Assessment/ key information: 38year old female who injured right elbow in near fall onice in [place]. She had injection with relief for 2 months, but pain has returned 5/10 today. She has full AROM in elbow and wrist, but limited  of 30# in standard position and 10# in stress posiiton. She has difficulty lifting carrying and opening containers. She is tender to palpation from 3 inch distal to elbow to 3 inches above elbow on triceps. Her FOTO is 44/100 reflecting severe impairment in function. She is currently being treated for hypertension and has had problems with anxiety and panic attacks. She is under considerable pressure due to family illness. She will benefit from skilled occupational therapy to improve right upper extremity strength and decrease pain.       Evaluation Complexity: History LOW Complexity : Brief history review  Examination LOW Complexity : 1-3 performance deficits relating to physical, cognitive , or psychosocial skils that result in activity limitations and / or participation restrictions  Clinical Decision Making LOW Complexity : No comorbidities that affect functional and no verbal or physical assistance needed to complete eval tasks   Overall Complexity Rating: LOW     Problem List: Pain effecting function, Decreased strength, Decreased ADL/functional abilities  and Decreased activity tolerance     Treatment Plan may include any combination of the following: Therapeutic exercise, Therapeutic activities, Neuromuscular re-education, Manual therapy, Splinting/orthoses, Patient education and ADLs/IADLs    Patient / Family readiness to learn indicated by: asking questions, trying to perform skills and interest    Persons(s) to be included in education:   patient (P)    Barriers to Learning/Limitations: None    Patient Goal (s): Help decrease stiffening in shoulder and neck from protecting elbow    Patient Self Reported Health Status: fair    Rehabilitation Potential: good    Short Term Goals: To be accomplished in 2 weeks:  1. Patient will be independent in home strategies for pain relief including heat and massage. 2.  Patient will be independent in stretching exercises to improve pain free ROM. 3.  Patient will be independent in scapula stabilization and posture exercises to decrease risk of recurrence. Long Term Goals: To be accomplished in 10 treatments:   1. Patient will improve right hand  in standard and stress position by at least 30# for increased ability to lift pans and groceries. 2.  Patient will  report pain 0-2/10 with routine use for work and home care tasks. 3.  Patient will report improved function in home care tasks such as cooking and sweeping as shown by increase in FOTO of at least 15 points.         Frequency / Duration: Patient to be seen 2-3 times per week for 10 treatments:    Patient/ Caregiver education and instruction: Diagnosis, prognosis, self care, activity modification, brace/ splint application and exercises   [x]  Plan of care has been reviewed with HERB Scott/ANG 6/6/2018 6:37 PM    _____________________________________________________________________    I certify that the above Therapy Services are being furnished while the patient is under my care. I agree with the treatment plan and certify that this therapy is necessary.     Physician's Signature:____________________  Date:____________Time:__________    Please sign and return to [facility] [employer]  Saeed Kirk [facility]  [phone] fax

## 2018-06-11 ENCOUNTER — HOSPITAL ENCOUNTER (OUTPATIENT)
Dept: PHYSICAL THERAPY | Age: 43
Discharge: HOME | End: 2018-06-11
Payer: COMMERCIAL

## 2018-06-11 PROCEDURE — 97110 THERAPEUTIC EXERCISES: CPT

## 2018-06-11 PROCEDURE — 97124 MASSAGE THERAPY: CPT

## 2018-06-11 NOTE — PROGRESS NOTES
OT DAILY TREATMENT NOTE  3-16    Patient Name: Ailin Knapp  Date:2018  : 1975  [x]  Patient  Verified  Payor: Soledad Lin / Plan: [address] [employer] / Product Type: HMO /    In time:730  Out time:800  Total Treatment Time (min): 30  Visit #: 2 of 10    Treatment Area: Pain in right elbow [M25.521]  Lateral epicondylitis, right elbow [M77.11]    SUBJECTIVE  Pain Level (0-10 scale): -8/10  Any medication changes, allergies to medications, adverse drug reactions, diagnosis change, or new procedure performed?: [x] No    [] Yes (see summary sheet for update)  Subjective functional status/changes:   [] No changes reported  Really sore this morning  Did all the exercises at home    OBJECTIVE    Modality rationale: decrease pain and increase tissue extensibility to improve the patients ability to use right wrist   Min Type Additional Details    [] Estim:  []Unatt       []IFC  []Premod                        []Other:  []w/ice   []w/heat  Position:  Location:    [] Estim: []Att    []TENS instruct  []NMES                    []Other:  []w/US   []w/ice   []w/heat  Position:  Location:    []  Traction: [] Cervical       []Lumbar                       [] Prone          []Supine                       []Intermittent   []Continuous Lbs:  [] before manual  [] after manual    []  Ultrasound: []Continuous   [] Pulsed                           []1MHz   []3MHz W/cm2:  Location:    []  Iontophoresis with dexamethasone         Location: [] Take home patch   [] In clinic   5 []  Ice     [x]  heat  []  Ice massage  []  Laser   []  Anodyne Position:shoulder and forearm  Location:    []  Laser with stim  []  Other:  Position:  Location:    []  Vasopneumatic Device Pressure:       [] lo [] med [] hi   Temperature: [] lo [] med [] hi   [x] Skin assessment post-treatment:  [x]intact []redness- no adverse reaction    []redness  adverse reaction:     15 min Therapeutic Exercise:  [] See flow sheet :   Rationale: increase strength to improve the patients ability to grasp, reach, improve posture  T band green rows, rows with ext, shoulder ext   Window pull downs x 5       10 min Manual Therapy:  *Instrument assisted and therapist**   Rationale: decrease pain, increase ROM and increase tissue extensibility to extend wrist,   Massage to dorsal forearm      With   [x] TE   [] TA   [] neuro   [] other: Patient Education: [x] Review HEP    [] Progressed/Changed HEP based on: T band ex, theracane[] positioning   [] body mechanics   [] transfers   [] heat/ice application   [] Splint wear/care   [] Sensory re-education   [] scar management      [] other:             Other Objective/Functional Measures:   Marked trigger points in tissues in dorsal forearm     Pain Level (0-10 scale) post treatment: 3/10    ASSESSMENT/Changes in Function: Reduced pain following massage and ex    Patient will continue to benefit from skilled OT services to modify and progress therapeutic interventions, address ROM deficits, address strength deficits, analyze and address soft tissue restrictions and instruct in home and community integration to attain remaining goals. []  See Plan of Care  []  See progress note/recertification  []  See Discharge Summary         Progress towards goals / Updated goals:  **1. Patient will be independent in home strategies for pain relief including heat and massage. introduced theracane 6/11/18  2. Patient will be independent in stretching exercises to improve pain free ROM. Phase II 6/11/18  3. Patient will be independent in scapula stabilization and posture exercises to decrease risk of recurrence. T band 6/11/18     Long Term Goals: To be accomplished in 10 treatments:                        1.  Patient will improve right hand  in standard and stress position by at least 30# for increased ability to lift pans and groceries. 2.  Patient will  report pain 0-2/10 with routine use for work and home care tasks.   3.  Patient will report improved function in home care tasks such as cooking and sweeping as shown by increase in FOTO of at least 15 points.   *    PLAN  [x]  Upgrade activities as tolerated     [x]  Continue plan of care  []  Update interventions per flow sheet       []  Discharge due to:_  []  Other:_      LEONORA Lujan 6/11/2018  8:08 AM    Future Appointments  Date Time Provider Susan Waller   6/14/2018 3:30 PM HERB Lujan/L [facility]   7/27/2018 3:20 PM Mike Acosta MD [address] [place] [address]

## 2018-06-14 ENCOUNTER — HOSPITAL ENCOUNTER (OUTPATIENT)
Dept: PHYSICAL THERAPY | Age: 43
Discharge: HOME | End: 2018-06-14
Payer: COMMERCIAL

## 2018-06-14 PROCEDURE — 97124 MASSAGE THERAPY: CPT

## 2018-06-14 PROCEDURE — 97035 APP MDLTY 1+ULTRASOUND EA 15: CPT

## 2018-06-14 PROCEDURE — 97110 THERAPEUTIC EXERCISES: CPT

## 2018-06-14 NOTE — PROGRESS NOTES
OT DAILY TREATMENT NOTE  3-    Patient Name: Kaylan Mcginnis  Date:2018  : 1975  [x]  Patient  Verified  Payor: Cherise Romano / Plan: [employer] / Product Type: HMO /    In time:335  Out time:405  Total Treatment Time (min): 30  Visit #: 3 of 10    Treatment Area: Pain in right elbow [M25.521]  Lateral epicondylitis, right elbow [M77.11]    SUBJECTIVE  Pain Level (0-10 scale): 1/10 elbow, 5/10 shoulder  Any medication changes, allergies to medications, adverse drug reactions, diagnosis change, or new procedure performed?: [x] No    [] Yes (see summary sheet for update)  Subjective functional status/changes:   [] No changes reported  My elbow is much better, my shoulder is sore  Ordered a theracane for home  Doing my exercises  Wrist flexion still painful on HEP with forearm mid position  OBJECTIVE    Modality rationale: decrease pain and increase tissue extensibility to improve the patients ability to move shoulder without pain   Min Type Additional Details    [] Estim:  []Unatt       []IFC  []Premod                        []Other:  []w/ice   []w/heat  Position:  Location:    [] Estim: []Att    []TENS instruct  []NMES                    []Other:  []w/US   []w/ice   []w/heat  Position:  Location:    []  Traction: [] Cervical       []Lumbar                       [] Prone          []Supine                       []Intermittent   []Continuous Lbs:  [] before manual  [] after manual   8 [x]  Ultrasound: []Continuous   [x] Bfgisn00%                         []1MHz   []3MHz W/cm2:1.0  Location:lat epicondyle right    []  Iontophoresis with dexamethasone         Location: [] Take home patch   [] In clinic   5 []  Ice     [x]  heat  []  Ice massage  []  Laser   []  Anodyne Position:  Location:right shoulder    []  Laser with stim  []  Other:  Position:  Location:    []  Vasopneumatic Device Pressure:       [] lo [] med [] hi   Temperature: [] lo [] med [] hi   [x] Skin assessment post-treatment:  [x]intact []redness- no adverse reaction    []redness  adverse reaction:     9 min Therapeutic Exercise:  [] See flow sheet :   Rationale: increase ROM and increase strength to improve the patients ability to move without pain  AROM neck stretch in lateral flexion, rotation, forward flexion and extension  T band green 15 each      8 min Manual Therapy:  Instruement assisted   Rationale: decrease pain, increase ROM and increase tissue extensibility to allow free neck and shoulder movemtnt  Massage with edge tool and OT to upper trap on right    With   [] TE   [] TA   [] neuro   [] other: Patient Education: [x] Review HEP    [] Progressed/Changed HEP based on:  Stretches for neck  [] positioning   [] body mechanics   [] transfers   [] heat/ice application   [] Splint wear/care   [] Sensory re-education   [] scar management      [] other:             Other Objective/Functional Measures:   Pain in elbow limited to just at epicondyle-above elbow pain eliminated     Pain Level (0-10 scale) post treatment: 0/10    ASSESSMENT/Changes in Function: Improving pain    Patient will continue to benefit from skilled OT services to modify and progress therapeutic interventions and address strength deficits to attain remaining goals. []  See Plan of Care  []  See progress note/recertification  []  See Discharge Summary         Progress towards goals / Updated goals:  *1.  Patient will be independent in home strategies for pain relief including heat and massage. introduced theracane 6/11/18  2.  Patient will be independent in stretching exercises to improve pain free ROM. Phase II 6/11/18, neck 6/14/18  3.  Patient will be independent in scapula stabilization and posture exercises to decrease risk of recurrence. T band 6/11/18      Long Term Goals: To be accomplished in 10 treatments:                        1.  Patient will improve right hand  in standard and stress position by at least 30# for increased ability to lift pans and groceries. 2.  Patient will  report pain 0-2/10 with routine use for work and home care tasks.   3.  Patient will report improved function in home care tasks such as cooking and sweeping as shown by increase in FOTO of at least 15 points.      PLAN  [x]  Upgrade activities as tolerated     [x]  Continue plan of care  []  Update interventions per flow sheet       []  Discharge due to:_  []  Other:_      Demetria Juarez OTR/L 6/14/2018  5:49 PM    Future Appointments  Date Time Provider Susan Waller   6/18/2018 7:30 AM Demetria Juarez OTR/L [facility]   6/20/2018 7:30 AM Demetria Juarez OTR/L [facility]   6/25/2018 7:30 AM Demetria Juarez, OTR/L [facility]   6/27/2018 7:30 AM Demetria Juarez OTR/L [facility]   7/27/2018 3:20 PM Nazanin Mireles MD [address] [place] [address]

## 2018-06-18 ENCOUNTER — APPOINTMENT (OUTPATIENT)
Dept: PHYSICAL THERAPY | Age: 43
End: 2018-06-18
Payer: COMMERCIAL

## 2018-06-20 ENCOUNTER — HOSPITAL ENCOUNTER (OUTPATIENT)
Dept: PHYSICAL THERAPY | Age: 43
Discharge: HOME | End: 2018-06-20
Payer: COMMERCIAL

## 2018-06-20 PROCEDURE — 97124 MASSAGE THERAPY: CPT

## 2018-06-20 PROCEDURE — 97035 APP MDLTY 1+ULTRASOUND EA 15: CPT

## 2018-06-20 PROCEDURE — 97110 THERAPEUTIC EXERCISES: CPT

## 2018-06-20 NOTE — PROGRESS NOTES
OT DAILY TREATMENT NOTE  3-16    Patient Name: Janina Viera  Date:2018  : 1975  [x]  Patient  Verified  Payor: Mehran Temple / Plan: [employer] / Product Type: HMO /    In time:730  Out time:805  Total Treatment Time (min): 35  Visit #: 4 of 10    Treatment Area: Pain in right elbow [M25.521]  Lateral epicondylitis, right elbow [M77.11]    SUBJECTIVE  Pain Level (0-10 scale): 2/10 elbow, 7-8/10 shoulder  Any medication changes, allergies to medications, adverse drug reactions, diagnosis change, or new procedure performed?: [x] No    [] Yes (see summary sheet for update)  Subjective functional status/changes:   [] No changes reported  My daughter slept in my bed and my neck got all contorted    OBJECTIVE    Modality rationale: decrease inflammation, decrease pain and increase tissue extensibility to improve the patients ability to move right arm   Min Type Additional Details    [] Estim:  []Unatt       []IFC  []Premod                        []Other:  []w/ice   []w/heat  Position:  Location:    [] Estim: []Att    []TENS instruct  []NMES                    []Other:  []w/US   []w/ice   []w/heat  Position:  Location:    []  Traction: [] Cervical       []Lumbar                       [] Prone          []Supine                       []Intermittent   []Continuous Lbs:  [] before manual  [] after manual   8 [x]  Ultrasound: []Continuous   [x] Pulsed                           []1MHz   [x]3MHz W/cm2:1.0  Location:right lateral epicondyle    []  Iontophoresis with dexamethasone         Location: [] Take home patch   [] In clinic   10 []  Ice     [x]  heat  []  Ice massage  []  Laser   []  Anodyne Position:  Location:Right shoulder concurrent with US to elbow    []  Laser with stim  []  Other:  Position:  Location:    []  Vasopneumatic Device Pressure:       [] lo [] med [] hi   Temperature: [] lo [] med [] hi   [x] Skin assessment post-treatment:  [x]intact []redness- no adverse reaction    []redness  adverse reaction:     15 min Therapeutic Exercise:  [] See flow sheet :   Rationale: increase ROM and increase strength to improve the patients ability to use right UE without pain, improve scapular stabilization to reduce forward head posture  T band 15 reps each green  Progressed to phase II stretches 10 each hold 15      10 min Manual Therapy:  Edge tool instrument assisted   Rationale: decrease pain, increase ROM and increase tissue extensibility to reduce pain at shoulder on ri  ght       With   [] TE   [] TA   [] neuro   [] other: Patient Education: [x] Review HEP    [x] Progressed/Changed HEP based on: progress to phase III  [] positioning   [] body mechanics   [] transfers   [] heat/ice application   [] Splint wear/care   [] Sensory re-education   [] scar management      [] other:             Other Objective/Functional Measures:   Tender on lateral epicondyle only     Pain Level (0-10 scale) post treatment: 2-3/10 elbow, 5/10 shoulder/neck    ASSESSMENT/Changes in Function: pain in elbowing and centralized , able to complete passive stretch in all but elbow extended pronated position without pain    Patient will continue to benefit from skilled OT services to modify and progress therapeutic interventions, address ROM deficits, address strength deficits, analyze and address soft tissue restrictions and instruct in home and community integration to attain remaining goals. []  See Plan of Care  []  See progress note/recertification  []  See Discharge Summary         Progress towards goals / Updated goals:  1.  Patient will be independent in home strategies for pain relief including heat and massage. introduced theracane 6/11/18met 6/20/18  2.  Patient will be independent in stretching exercises to improve pain free ROM. Phase II 6/11/18, neck 6/14/18, phase II 6/20/18  3.  Patient will be independent in scapula stabilization and posture exercises to decrease risk of recurrence. met T band 6/11/18      Long Term Goals: To be accomplished in 10 treatments:                        1.  Patient will improve right hand  in standard and stress position by at least 30# for increased ability to lift pans and groceries. 2.  Patient will  report pain 0-2/10 with routine use for work and home care tasks.   3.  Patient will report improved function in home care tasks such as cooking and sweeping as shown by increase in FOTO of at least 15 points.      PLAN  [x]  Upgrade activities as tolerated     [x]  Continue plan of care  []  Update interventions per flow sheet       []  Discharge due to:_  []  Other:_      Ema Fall, OTR/L 6/20/2018  8:08 AM    Future Appointments  Date Time Provider Susan Waller   6/25/2018 7:30 AM Ema Fall, OTR/L [facility]   6/27/2018 7:30 AM Ema Fall, OTR/L [facility]   7/27/2018 3:20 PM Vianney Hart MD [address] [place] [address]

## 2018-06-25 ENCOUNTER — HOSPITAL ENCOUNTER (OUTPATIENT)
Dept: PHYSICAL THERAPY | Age: 43
Discharge: HOME | End: 2018-06-25
Payer: COMMERCIAL

## 2018-06-25 PROCEDURE — 97110 THERAPEUTIC EXERCISES: CPT

## 2018-06-25 PROCEDURE — 97035 APP MDLTY 1+ULTRASOUND EA 15: CPT

## 2018-06-25 NOTE — PROGRESS NOTES
OT DAILY TREATMENT NOTE  3-16    Patient Name: Leodan Saucedo  Date:2018  : 1975  [x]  Patient  Verified  Payor: Daniel Mcguire / Plan: [employer] / Product Type: HMO /    In time:730  Out time:800  Total Treatment Time (min): 30  Visit #: 5 of 10    Treatment Area: Pain in right elbow [M25.521]  Lateral epicondylitis, right elbow [M77.11]    SUBJECTIVE  Pain Level (0-10 scale): 3/10 forearm, 0/10 shoulder  Any medication changes, allergies to medications, adverse drug reactions, diagnosis change, or new procedure performed?: [x] No    [] Yes (see summary sheet for update)  Subjective functional status/changes:   [] No changes reported  My elbow got a spasm coming in today    OBJECTIVE    Modality rationale: decrease pain and increase tissue extensibility to improve the patients ability to use right arm   Min Type Additional Details    [] Estim:  []Unatt       []IFC  []Premod                        []Other:  []w/ice   []w/heat  Position:  Location:    [] Estim: []Att    []TENS instruct  []NMES                    []Other:  []w/US   []w/ice   []w/heat  Position:  Location:    []  Traction: [] Cervical       []Lumbar                       [] Prone          []Supine                       []Intermittent   []Continuous Lbs:  [] before manual  [] after manual   8 [x]  Ultrasound: []Continuous   [x] Fyrbiv24%                         []1MHz   [x]3MHz W/cm2:1.2  Location:lateral epicondyle    []  Iontophoresis with dexamethasone         Location: [] Take home patch   [] In clinic   10 []  Ice     [x]  heat  []  Ice massage  []  Laser   []  Anodyne Position:  Location:shoulder right  Concurrent with US    []  Laser with stim  []  Other:  Position:  Location:    []  Vasopneumatic Device Pressure:       [] lo [] med [] hi   Temperature: [] lo [] med [] hi   [x] Skin assessment post-treatment:  [x]intact []redness- no adverse reaction    []redness  adverse reaction:     20 min Therapeutic Exercise:  [] See flow sheet :   Rationale: increase ROM and increase strength to improve the patients ability to reach  Changed to blue t band for exercises  Added wall slides in abduction back on wall to stabilize scapula against chest wall  Added wall lift off in abduction  Added weight to wrist ex-10 for flex and RD, only 5 tolerated for ext 1#    With   [] TE   [] TA   [] neuro   [] other: Patient Education: [x] Review HEP    [] Progressed/Changed HEP based on: lift offs, wall slides  [] positioning   [] body mechanics   [] transfers   [] heat/ice application   [] Splint wear/care   [] Sensory re-education   [] scar management      [] other:             Other Objective/Functional Measures:   Spasms in extensors with weight after 5 reps     Pain Level (0-10 scale) post treatment: 0/10    ASSESSMENT/Changes in Function: Improving pain,    Patient will continue to benefit from skilled OT services to modify and progress therapeutic interventions, address ROM deficits, address strength deficits, analyze and address soft tissue restrictions and instruct in home and community integration to attain remaining goals. []  See Plan of Care  []  See progress note/recertification  []  See Discharge Summary         Progress towards goals / Updated goals:  *1.  Patient will be independent in home strategies for pain relief including heat and massage. introduced theracane 6/11/18met 6/20/18  2.  Patient will be independent in stretching exercises to improve pain free ROM. Phase II 6/11/18, neck 6/14/18, phase II 6/20/18  3.  Patient will be independent in scapula stabilization and posture exercises to decrease risk of recurrence. met T band 6/11/18, added lift off 6/25/18      Long Term Goals: To be accomplished in 10 treatments:                        1.  Patient will improve right hand  in standard and stress position by at least 30# for increased ability to lift pans and groceries.   2.  Patient will  report pain 0-2/10 with routine use for work and home care tasksprogressing 6/25/18.   3.  Patient will report improved function in home care tasks such as cooking and sweeping as shown by increase in FOTO of at least 15 points.  *    PLAN  [x]  Upgrade activities as tolerated     [x]  Continue plan of care  []  Update interventions per flow sheet       []  Discharge due to:_  []  Other:_      Donnice Lundborg, OTR/L 6/25/2018  8:07 AM    Future Appointments  Date Time Provider Susan Waller   6/27/2018 7:30 AM Donnice Lundborg, OTR/ANG [facility]   7/27/2018 3:20 PM Nathan Worthington MD [address] [place] [address]

## 2018-06-27 ENCOUNTER — HOSPITAL ENCOUNTER (OUTPATIENT)
Dept: PHYSICAL THERAPY | Age: 43
Discharge: HOME | End: 2018-06-27
Payer: COMMERCIAL

## 2018-06-27 PROCEDURE — 97110 THERAPEUTIC EXERCISES: CPT

## 2018-06-27 PROCEDURE — 97035 APP MDLTY 1+ULTRASOUND EA 15: CPT

## 2018-06-27 NOTE — PROGRESS NOTES
OT DAILY TREATMENT NOTE  3-16    Patient Name: Keith Hdez  Date:2018  : 1975  [x]  Patient  Verified  Payor: Marshal Verma / Plan: [employer] / Product Type: HMO /    In time:740  Out time:805  Total Treatment Time (min): 25  Visit #: 6 of 10    Treatment Area: Pain in right elbow [M25.521]  Lateral epicondylitis, right elbow [M77.11]    SUBJECTIVE  Pain Level (0-10 scale): 0/10 elbow, 3/10 shoulder  Any medication changes, allergies to medications, adverse drug reactions, diagnosis change, or new procedure performed?: [x] No    [] Yes (see summary sheet for update)  Subjective functional status/changes:   [] No changes reported  I slept on my arm funny and my shoulder is sore  Wore brace and strap and had no pain yesterday    OBJECTIVE    Modality rationale: decrease inflammation and decrease pain to improve the patients ability to use right arm   Min Type Additional Details    [] Estim:  []Unatt       []IFC  []Premod                        []Other:  []w/ice   []w/heat  Position:  Location:    [] Estim: []Att    []TENS instruct  []NMES                    []Other:  []w/US   []w/ice   []w/heat  Position:  Location:    []  Traction: [] Cervical       []Lumbar                       [] Prone          []Supine                       []Intermittent   []Continuous Lbs:  [] before manual  [] after manual   8 [x]  Ultrasound: []Continuous   [x] Eueksf33%                         []1MHz   [x]3MHz W/cm2:1.0  Location:lateral epicondyle right    []  Iontophoresis with dexamethasone         Location: [] Take home patch   [] In clinic   10 []  Ice     [x]  heat  []  Ice massage  []  Laser   []  Anodyne Position:  Location:right shoulder/neck  Concurrent with US    []  Laser with stim  []  Other:  Position:  Location:    []  Vasopneumatic Device Pressure:       [] lo [] med [] hi   Temperature: [] lo [] med [] hi   [x] Skin assessment post-treatment:  [x]intact []redness- no adverse reaction    []redness  adverse reaction:     20 min Therapeutic Exercise:  [] See flow sheet :   Rationale: increase ROM and increase strength to improve the patients ability to move right arm without pain  Stage II wrist flexion in mid and pronation x 10  Wrist flex ext and RD x 15 1#  Blue tband x 15 extension, rows  Wall lift offs abduction, jumping jacks for stabilization on wall x 10      With   [] TE   [] TA   [] neuro   [] other: Patient Education: [x] Review HEP    [] Progressed/Changed HEP based on:  Continue HEP  [] positioning   [] body mechanics   [] transfers   [] heat/ice application   [] Splint wear/care   [] Sensory re-education   [] scar management      [] other:             Other Objective/Functional Measures:   Able to tolerate wrist ex today with weight without pain     Pain Level (0-10 scale) post treatment: 0/10    ASSESSMENT/Changes in Function: Improving strength, pain    Patient will continue to benefit from skilled OT services to modify and progress therapeutic interventions, address strength deficits, analyze and address soft tissue restrictions and instruct in home and community integration to attain remaining goals. []  See Plan of Care  []  See progress note/recertification  []  See Discharge Summary         Progress towards goals / Updated goals:  **1.  Patient will be independent in home strategies for pain relief including heat and massage. introduced theracane 6/11/18met 6/20/18  2.  Patient will be independent in stretching exercises to improve pain free ROM. Phase II 6/11/18, neck 6/14/18, phase II 6/20/18  3.  Patient will be independent in scapula stabilization and posture exercises to decrease risk of recurrence. met T band 6/11/18, added lift off 6/25/18      Long Term Goals: To be accomplished in 10 treatments:                        1.  Patient will improve right hand  in standard and stress position by at least 30# for increased ability to lift pans and groceries.   2.  Patient will  report pain 0-2/10 with routine use for work and home care tasksmet 6/27/18, look for consistency  3.  Patient will report improved function in home care tasks such as cooking and sweeping as shown by increase in FOTO of at least 15 points.  progressing increased 10 6/27/18*    PLAN  [x]  Upgrade activities as tolerated     [x]  Continue plan of care  []  Update interventions per flow sheet       []  Discharge due to:_  []  Other:_      HERB Brandon/L 6/27/2018  8:23 AM    Future Appointments  Date Time Provider Susan Waller   7/3/2018 7:30 AM HERB Brandon/L [facility]   7/27/2018 3:20 PM Stacie Murray MD [address] [place] [address]

## 2018-07-03 ENCOUNTER — HOSPITAL ENCOUNTER (OUTPATIENT)
Dept: PHYSICAL THERAPY | Age: 43
Discharge: HOME | End: 2018-07-03
Payer: COMMERCIAL

## 2018-07-03 PROCEDURE — 97124 MASSAGE THERAPY: CPT

## 2018-07-03 PROCEDURE — 97110 THERAPEUTIC EXERCISES: CPT

## 2018-07-03 NOTE — PROGRESS NOTES
OT DAILY TREATMENT NOTE  3-16    Patient Name: Toni Elkins  Date:7/3/2018  : 1975  [x]  Patient  Verified  Payor: Shey Powell / Plan: [employer] / Product Type: HMO /    In time:740  Out time:805  Total Treatment Time (min): 25  Visit #: 7 of 10    Treatment Area: Pain in right elbow [M25.521]  Lateral epicondylitis, right elbow [M77.11]    SUBJECTIVE  Pain Level (0-10 scale): 0/10 elbow, 6/10 neck  Any medication changes, allergies to medications, adverse drug reactions, diagnosis change, or new procedure performed?: [x] No    [] Yes (see summary sheet for update)  Subjective functional status/changes:   [] No changes reported  Ss heavierlept wrong on my neck  Sore in my biceps  Can vacuum the pool now, still hurts to do the house because the vacuum i    OBJECTIVE    Modality rationale: decrease pain and increase tissue extensibility to improve the patients ability to lift   Min Type Additional Details    [] Estim:  []Unatt       []IFC  []Premod                        []Other:  []w/ice   []w/heat  Position:  Location:    [] Estim: []Att    []TENS instruct  []NMES                    []Other:  []w/US   []w/ice   []w/heat  Position:  Location:    []  Traction: [] Cervical       []Lumbar                       [] Prone          []Supine                       []Intermittent   []Continuous Lbs:  [] before manual  [] after manual    []  Ultrasound: []Continuous   [] Pulsed                           []1MHz   []3MHz W/cm2:  Location:    []  Iontophoresis with dexamethasone         Location: [] Take home patch   [] In clinic   10 []  Ice     [x]  heat  []  Ice massage  []  Laser   []  Anodyne Position:  Location:right shoulder    []  Laser with stim  []  Other:  Position:  Location:    []  Vasopneumatic Device Pressure:       [] lo [] med [] hi   Temperature: [] lo [] med [] hi   [x] Skin assessment post-treatment:  [x]intact []redness- no adverse reaction    []redness  adverse reaction:     17 min Therapeutic Exercise:  [] See flow sheet :   Rationale: increase strength to improve the patients ability to lei  Recheck   T band with blue for HEp 15 ech  Wall slides and lift off x 10  Wrist ex with 1# x 15      8 min Manual Therapy:  Edge tool (concurrent with hot   Rationale: decrease pain and increase ROM to improve lifting  Massage with edge tool to biceps      With   [] TE   [] TA   [] neuro   [] other: Patient Education: [x] Review HEP    [] Progressed/Changed HEP based on:   [] positioning   [] body mechanics   [] transfers   [] heat/ice application   [] Splint wear/care   [] Sensory re-education   [] scar management      [] other:             Other Objective/Functional Measures:    standard  49 (30)   stress  32 (10)    Pain Level (0-10 scale) post treatment: 0/10    ASSESSMENT/Changes in Function: Improved strength, no longer tender at epicondyle    Patient will continue to benefit from skilled OT services to modify and progress therapeutic interventions, address strength deficits, analyze and address soft tissue restrictions and instruct in home and community integration to attain remaining goals. []  See Plan of Care  []  See progress note/recertification  []  See Discharge Summary         Progress towards goals / Updated goals:  *1.  Patient will be independent in home strategies for pain relief including heat and massage. introduced theracane 6/11/18met 6/20/18  2.  Patient will be independent in stretching exercises to improve pain free ROM. Phase II 6/11/18, neck 6/14/18, phase II 6/20/18  3.  Patient will be independent in scapula stabilization and posture exercises to decrease risk of recurrence. met T band 6/11/18, added lift off 6/25/18      Long Term Goals: To be accomplished in 10 treatments:                        1.  Patient will improve right hand  in standard and stress position by at least 30# for increased ability to lift pans and groceries. progressing increased 19 in standard and 22 in stress position  2.  Patient will  report pain 0-2/10 with routine use for work and home care tasksmet or elbow   3.  Patient will report improved function in home care tasks such as cooking and sweeping as shown by increase in FOTO of at least 15 points.  progressing increased 10 6/27/18*   **    PLAN  [x]  Upgrade activities as tolerated     [x]  Continue plan of care  []  Update interventions per flow sheet       []  Discharge due to:_  []  Other:_      HERB Banuelos/L 7/3/2018  7:46 AM    Future Appointments  Date Time Provider Susan Waller   7/27/2018 3:20 PM Rasta Carlson MD [address] [place] [address]

## 2018-07-03 NOTE — PROGRESS NOTES
[facility] 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 Ongoing pain in right side of neck/shoulder intermittent with night positioning.  in standard position increased 19# to 49, in stress position increased 22# to 32. Prior goals and progress:  1.  Patient will be independent in home strategies for pain relief including heat and massage. met  2.  Patient will be independent in stretching exercises to improve pain free ROM. met  3.  Patient will be independent in scapula stabilization and posture exercises to decrease risk of recurrence. met       Long Term Goals:                       1.  Patient will improve right hand  in standard and stress position by at least 30# for increased ability to lift pans and groceries. progressing increased 19 in standard and 22 in stress position  2.  Patient will  report pain 0-2/10 with routine use for work and home care tasksmet for elbow   3.  Patient will report improved function in home care tasks such as cooking and sweeping as shown by increase in FOTO of at least 15 points.  progressing increased 10     Updated Goals: to be achieved in 3 visits:  1.  Patient will improve right hand  in standard and stress position by at least 30# for increased ability to lift pans and groceries. 3.  Patient will report improved function in home care tasks such as cooking and sweeping as shown by increase in FOTO of at least 15 points. ASSESSMENT/RECOMMENDATIONS:  [x]Continue therapy per initial plan/protocol at a frequency of  2 x per week for 3 visits  []Continue therapy with the following recommended changes:_____________________      _____________________________________________________________________  []Discontinue therapy progressing towards or have reached established goals  []Discontinue therapy due to lack of appreciable progress towards goals  []Discontinue therapy due to lack of attendance or compliance  []Await Physician's recommendations/decisions regarding therapy  []Other:________________________________________________________________    Thank you for this referral.   HERB Cruz/L 7/3/2018 10:09 AM  NOTE TO PHYSICIAN:  [address] TO [facility]: [phone]  If you are unable to process this request in 24 hours please contact our office: [phone] I have read the above report and request that my patient continue as recommended. ? I have read the above report and request that my patient continue therapy with the following changes/special instructions:________________________________________________________  ? I have read the above report and request that my patient be discharged from therapy.     Physicians signature: ________________________________Date: _____Time:_____

## 2018-07-09 ENCOUNTER — APPOINTMENT (OUTPATIENT)
Dept: PHYSICAL THERAPY | Age: 43
End: 2018-07-09
Payer: COMMERCIAL

## 2018-07-11 ENCOUNTER — HOSPITAL ENCOUNTER (OUTPATIENT)
Dept: PHYSICAL THERAPY | Age: 43
Discharge: HOME | End: 2018-07-11
Payer: COMMERCIAL

## 2018-07-11 PROCEDURE — 97124 MASSAGE THERAPY: CPT

## 2018-07-11 PROCEDURE — 97110 THERAPEUTIC EXERCISES: CPT

## 2018-07-11 NOTE — PROGRESS NOTES
OT DAILY TREATMENT NOTE  3-16    Patient Name: Delta Chavez  Date:2018  : 1975  [x]  Patient  Verified  Payor: Charlotte Flores / Plan: [address] [place] [address] [facility] [place] HMO / Product Type: HMO /    In time:735  Out time:806  Total Treatment Time (min): 31  Visit #: 8 of 10    Treatment Area: Pain in right elbow [M25.521]  Lateral epicondylitis, right elbow [M77.11]    SUBJECTIVE  Pain Level (0-10 scale): 1/10 elbow, 6/10 shoulder  Any medication changes, allergies to medications, adverse drug reactions, diagnosis change, or new procedure performed?: [x] No    [] Yes (see summary sheet for update)  Subjective functional status/changes:   [] No changes reported  I slept on my stomach and it made my shoulder/neck hurt  I got the theracane  I can hold a glass full with my right hand now  Biceps really hurting last night    OBJECTIVE    Modality rationale: decrease pain and increase tissue extensibility to improve the patients ability to use right arm   Min Type Additional Details    [] Estim:  []Unatt       []IFC  []Premod                        []Other:  []w/ice   []w/heat  Position:  Location:    [] Estim: []Att    []TENS instruct  []NMES                    []Other:  []w/US   []w/ice   []w/heat  Position:  Location:    []  Traction: [] Cervical       []Lumbar                       [] Prone          []Supine                       []Intermittent   []Continuous Lbs:  [] before manual  [] after manual    []  Ultrasound: []Continuous   [] Pulsed                           []1MHz   []3MHz W/cm2:  Location:    []  Iontophoresis with dexamethasone         Location: [] Take home patch   [] In clinic   8 []  Ice     [x]  heat  []  Ice massage  []  Laser   []  Anodyne Position:  Location:right shoulder  concurrent with massage    []  Laser with stim  []  Other:  Position:  Location:    []  Vasopneumatic Device Pressure:       [] lo [] med [] hi   Temperature: [] lo [] med [] hi   [x] Skin assessment post-treatment:  [x]intact []redness- no adverse reaction    []redness  adverse reaction:     23 min Therapeutic Exercise:  [] See flow sheet :   Rationale: increase strength to improve the patients ability to grasp  Added red therabar 3 ways x 15  Added lift off with ext rot x 10  Added triceps ball press x 10  Increased weight to 2# for wrist ex      8 min Manual Therapy:  Instrument assisted   Rationale: decrease pain and increase tissue extensibility to reduce pain in biceps       With   [] TE   [] TA   [] neuro   [] other: Patient Education: [x] Review HEP    [] Progressed/Changed HEP based on:   [] positioning   [] body mechanics   [] transfers   [] heat/ice application   [] Splint wear/care   [] Sensory re-education   [] scar management      [x] other: positioning in chair            Other Objective/Functional Measures:   Pain with wrist flex with 2#     Pain Level (0-10 scale) post treatment: 1/10 elbow, 0/10 shoulder    ASSESSMENT/Changes in Function: Improving strength    Patient will continue to benefit from skilled OT services to modify and progress therapeutic interventions, address strength deficits, analyze and address soft tissue restrictions, analyze and cue movement patterns and instruct in home and community integration to attain remaining goals. []  See Plan of Care  []  See progress note/recertification  []  See Discharge Summary         Progress towards goals / Updated goals:  *1.  Patient will improve right hand  in standard and stress position by at least 30# for increased ability to lift pans and groceries. progressing with therabar 7/11/18  3.  Patient will report improved function in home care tasks such as cooking and sweeping as shown by increase in FOTO of at least 15 points.       PLAN  [x]  Upgrade activities as tolerated     []  Continue plan of care  []  Update interventions per flow sheet       []  Discharge due to:_  []  Other:_      HERB Erickson/L 7/11/2018  7:58 AM    Future Appointments  Date Time Provider Susan Waller   7/13/2018 7:30 AM Kittyshara Martin, OTR/L [facility]   7/16/2018 7:30 AM Kittyshara Martin, OTR/L [facility]   7/27/2018 3:20 PM Ever Blackwood MD [address] [place] [address]

## 2018-07-13 ENCOUNTER — HOSPITAL ENCOUNTER (OUTPATIENT)
Dept: PHYSICAL THERAPY | Age: 43
Discharge: HOME | End: 2018-07-13
Payer: COMMERCIAL

## 2018-07-13 PROCEDURE — 97124 MASSAGE THERAPY: CPT

## 2018-07-13 PROCEDURE — 97110 THERAPEUTIC EXERCISES: CPT

## 2018-07-13 NOTE — PROGRESS NOTES
OT DAILY TREATMENT NOTE  3-16    Patient Name: Elliott Fuentes  Date:2018  : 1975  [x]  Patient  Verified  Payor: Mary Barragan / Plan: [employer] / Product Type: HMO /    In time:730  Out time:800  Total Treatment Time (min): 30  Visit #: 9 of 10    Treatment Area: Pain in right elbow [M25.521]  Lateral epicondylitis, right elbow [M77.11]    SUBJECTIVE  Pain Level (0-10 scale): 1-2  Any medication changes, allergies to medications, adverse drug reactions, diagnosis change, or new procedure performed?: [x] No    [] Yes (see summary sheet for update)  Subjective functional status/changes:   [] No changes reported  Biceps tight at end    OBJECTIVE      22 min Therapeutic Exercise:  [] See flow sheet :   Rationale: increase strength to improve the patients ability to lift,   Added triceps extension  All reps to 15 now      *8** min Manual Therapy:  IASTM   Rationale: decrease pain and increase ROM to improve RUE use  Massage and IASTM to biceps insertion and belly    With   [] TE   [] TA   [] neuro   [] other: Patient Education: [x] Review HEP    [] Progressed/Changed HEP based on:tri eps extension  [] positioning   [] body mechanics   [] transfers   [] heat/ice application   [] Splint wear/care   [] Sensory re-education   [] scar management      [] other:             Other Objective/Functional Measures:   Able to complete 15 with therabar without pain     Pain Level (0-10 scale) post treatment: 0/10    ASSESSMENT/Changes in Function: Improving strength, pain    Patient will continue to benefit from skilled OT services to modify and progress therapeutic interventions and address strength deficits to attain remaining goals.      []  See Plan of Care  []  See progress note/recertification  []  See Discharge Summary         Progress towards goals / Updated goals:  **1.  Patient will improve right hand  in standard and stress position by at least 30# for increased ability to lift pans and groceries. progressing with therabar 7/11/18  3.  Patient will report improved function in home care tasks such as cooking and sweeping as shown by increase in FOTO of at least 15 points.   *    PLAN  [x]  Upgrade activities as tolerated     []  Continue plan of care  []  Update interventions per flow sheet       []  Discharge due to:_  []  Other:_      LEONORA Abrams 7/13/2018  7:48 AM    Future Appointments  Date Time Provider Susan Waller   7/16/2018 7:30 AM HERB Abrams/ANG [facility]   7/27/2018 3:20 PM Skye Mock MD [address] [place] [address]

## 2018-07-16 ENCOUNTER — HOSPITAL ENCOUNTER (OUTPATIENT)
Dept: PHYSICAL THERAPY | Age: 43
Discharge: HOME | End: 2018-07-16
Payer: COMMERCIAL

## 2018-07-16 PROCEDURE — 97035 APP MDLTY 1+ULTRASOUND EA 15: CPT

## 2018-07-16 PROCEDURE — 97110 THERAPEUTIC EXERCISES: CPT

## 2018-07-16 NOTE — PROGRESS NOTES
OT DAILY TREATMENT NOTE  3    Patient Name: Elizabeth Aquino  Date:2018  : 1975  [x]  Patient  Verified  Payor: Lawson Albrecht / Plan: [employer] / Product Type: HMO /    In time:735  Out time:800  Total Treatment Time (min): 25  Visit #: 10 of 10    Treatment Area: Pain in right elbow [M25.521]  Lateral epicondylitis, right elbow [M77.11]    SUBJECTIVE  Pain Level (0-10 scale): 0/10  Any medication changes, allergies to medications, adverse drug reactions, diagnosis change, or new procedure performed?: [x] No    [] Yes (see summary sheet for update)  Subjective functional status/changes:   [] No changes reported  I did a lot of housework  Stil have trouble lifting my purse or the full laundry basket    OBJECTIVE    Modality rationale: decrease inflammation and decrease pain to improve the patients ability to lift   Min Type Additional Details    [] Estim:  []Unatt       []IFC  []Premod                        []Other:  []w/ice   []w/heat  Position:  Location:    [] Estim: []Att    []TENS instruct  []NMES                    []Other:  []w/US   []w/ice   []w/heat  Position:  Location:    []  Traction: [] Cervical       []Lumbar                       [] Prone          []Supine                       []Intermittent   []Continuous Lbs:  [] before manual  [] after manual   8 [x]  Ultrasound: []Continuous   [x] Cloczg47%                         []1MHz   []3MHz W/cm2:1.0  Location:biceps    []  Iontophoresis with dexamethasone         Location: [] Take home patch   [] In clinic    []  Ice     []  heat  []  Ice massage  []  Laser   []  Anodyne Position:  Location:    []  Laser with stim  []  Other:  Position:  Location:    []  Vasopneumatic Device Pressure:       [] lo [] med [] hi   Temperature: [] lo [] med [] hi   [] Skin assessment post-treatment:  []intact []redness- no adverse reaction    []redness  adverse reaction:     17 min Therapeutic Exercise:  [x] See flow sheet :   Rationale: increase strength to improve the patients ability to grasp  Recheck   Changed to green therabar 15 each        With   [] TE   [] TA   [] neuro   [] other: Patient Education: [x] Review HEP    [] Progressed/Changed HEP based on: HEP review  [] positioning   [] body mechanics   [] transfers   [] heat/ice application   [] Splint wear/care   [] Sensory re-education   [] scar management      [] other:             Other Objective/Functional Measures:    stress:42  Standard: 59  FOTO 59     Pain Level (0-10 scale) post treatment: 0/10    ASSESSMENT/Changes in Function: Improved function,mproved strength, pain         []  See Plan of Care  []  See progress note/recertification  [x]  See Discharge Summary         Progress towards goals / Updated goals:  *1.  Patient will improve right hand  in standard and stress position by at least 30# for increased ability to lift pans and groceries. met  3.  Patient will report improved function in home care tasks such as cooking and sweeping as shown by increase in FOTO of at least 15 points. *-met*    PLAN  []  Upgrade activities as tolerated     []  Continue plan of care  []  Update interventions per flow sheet       [x]  Discharge due to:goals met/partially met_  []  Other:_      LEONORA Bull 7/16/2018  8:02 AM    Future Appointments  Date Time Provider Susan Waller   7/27/2018 3:20 PM Mandeep Ferreira MD [address] [place] [address]

## 2018-07-17 NOTE — PROGRESS NOTES
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 Pain on discharge day 010.   She has HEP including exercises to prevent re-occurrence    Goal:1.  Patient will improve right hand  in standard and stress position by at least 30# for increased ability to lift pans and groceries  Status at last note/certification: standard 30#, stress position 10#  Status at discharge: met  now 59# standard, 42# stress    Goal:3.  Patient will report improved function in home care tasks such as cooking and sweeping as shown by increase in FOTO of at least 15 points  Status at last note/certification:FOTO 44  Status at discharge: met FOTO 59    ASSESSMENT/Changes in Function: Excellent improvement in pain, Strength and function    ASSESSMENT/RECOMMENDATIONS:  [x]Discontinue therapy: [x]Patient has reached or is progressing toward set goals      []Patient is non-compliant or has abdicated      []Due to lack of appreciable progress towards set goals    HERB Olivares/L 2018 1:11 PM

## 2019-09-14 ENCOUNTER — HOSPITAL ENCOUNTER (OUTPATIENT)
Dept: LAB | Age: 44
Discharge: HOME | End: 2019-09-14
Payer: COMMERCIAL

## 2019-09-14 ENCOUNTER — HOSPITAL ENCOUNTER (OUTPATIENT)
Dept: GENERAL RADIOLOGY | Age: 44
Discharge: HOME | End: 2019-09-14
Payer: COMMERCIAL

## 2019-09-14 DIAGNOSIS — R06.09 OTHER FORMS OF DYSPNEA: ICD-10-CM

## 2019-09-14 LAB — SENTARA SPECIMEN COL,SENBCF: NORMAL

## 2019-09-14 PROCEDURE — 71046 X-RAY EXAM CHEST 2 VIEWS: CPT

## 2019-09-14 PROCEDURE — 99001 SPECIMEN HANDLING PT-LAB: CPT

## 2020-01-18 ENCOUNTER — HOSPITAL ENCOUNTER (OUTPATIENT)
Dept: MAMMOGRAPHY | Age: 45
Discharge: HOME | End: 2020-01-18
Attending: OBSTETRICS & GYNECOLOGY
Payer: COMMERCIAL

## 2020-01-18 DIAGNOSIS — Z12.31 VISIT FOR SCREENING MAMMOGRAM: ICD-10-CM

## 2020-01-18 PROCEDURE — 77063 BREAST TOMOSYNTHESIS BI: CPT

## 2020-12-23 ENCOUNTER — TRANSCRIBE ORDER (OUTPATIENT)
Dept: SCHEDULING | Age: 45
End: 2020-12-23

## 2020-12-23 DIAGNOSIS — N63.20 BREAST MASS, LEFT: Primary | ICD-10-CM

## 2021-01-13 ENCOUNTER — HOSPITAL ENCOUNTER (OUTPATIENT)
Dept: MAMMOGRAPHY | Age: 46
Discharge: HOME | End: 2021-01-13
Payer: COMMERCIAL

## 2021-01-13 ENCOUNTER — HOSPITAL ENCOUNTER (OUTPATIENT)
Dept: ULTRASOUND IMAGING | Age: 46
Discharge: HOME | End: 2021-01-13
Payer: COMMERCIAL

## 2021-01-13 DIAGNOSIS — N63.20 BREAST MASS, LEFT: ICD-10-CM

## 2021-01-13 PROCEDURE — 77062 BREAST TOMOSYNTHESIS BI: CPT

## 2021-01-13 PROCEDURE — 76642 ULTRASOUND BREAST LIMITED: CPT

## 2022-01-18 RX ORDER — LANOLIN ALCOHOL/MO/W.PET/CERES
500 CREAM (GRAM) TOPICAL DAILY
COMMUNITY

## 2022-01-18 RX ORDER — METOPROLOL SUCCINATE 25 MG/1
TABLET, EXTENDED RELEASE ORAL DAILY
COMMUNITY

## 2022-01-18 RX ORDER — TRIAMTERENE/HYDROCHLOROTHIAZID 37.5-25 MG
TABLET ORAL DAILY
COMMUNITY

## 2022-01-18 RX ORDER — ERGOCALCIFEROL 1.25 MG/1
50000 CAPSULE ORAL
COMMUNITY

## 2022-01-18 RX ORDER — OMEPRAZOLE 40 MG/1
40 CAPSULE, DELAYED RELEASE ORAL DAILY
COMMUNITY

## 2022-01-18 RX ORDER — BISMUTH SUBSALICYLATE 262 MG
1 TABLET,CHEWABLE ORAL DAILY
COMMUNITY

## 2022-01-18 RX ORDER — MINERAL OIL
ENEMA (ML) RECTAL
COMMUNITY

## 2022-01-18 RX ORDER — ARMODAFINIL 250 MG/1
TABLET ORAL
COMMUNITY

## 2022-01-18 NOTE — PERIOP NOTES
PRE-SURGICAL INSTRUCTIONS        Patient's Name:  Nile Solis      FHZUTChelyY Date:  1/18/2022            Covid Testing Date and Time:    Surgery Date:  1/27/2022                1. Do NOT eat or drink anything, including candy, gum, or ice chips after midnight on 1/26/2022, unless you have specific instructions from your surgeon or anesthesia provider to do so.  2. You may brush your teeth before coming to the hospital.  3. No smoking 24 hours prior to the day of surgery. 4. No alcohol 24 hours prior to the day of surgery. 5. No recreational drugs for one week prior to the day of surgery. 6. Leave all valuables, including money/purse, at home. 7. Remove all jewelry, nail polish, acrylic nails, and makeup (including mascara); no lotions powders, deodorant, or perfume/cologne/after shave on the skin. 8. Follow instruction for Hibiclens washes and CHG wipes from surgeon's office. 9. Glasses/contact lenses and dentures may be worn to the hospital.  They will be removed prior to surgery. 10. Call your doctor if symptoms of a cold or illness develop within 24-48 hours prior to your surgery. 11.  If you are having an outpatient procedure, please make arrangements for a responsible ADULT TO [address] [place] [address] and stay with you for 24 hours after your surgery. 12. ONE VISITOR in the hospital at this time for outpatient procedures. Exceptions may be made for surgical admissions, per nursing unit guidelines      Special Instructions:      Bring list of CURRENT medications. Bring inhaler. Bring CPAP machine. Bring any pertinent legal medical records. Take these medications the morning of surgery with a sip of water:  Metoprolol and MD instruction. Follow physician instructions about insulin. Follow physician instructions about stopping anticoagulants. Complete bowel prep per MD instructions. On the day of surgery, come in the main entrance of [facility].   Let the  at the desk know you are there for surgery. A staff member will come escort you to the surgical area on the second floor. If you have any questions or concerns, please do not hesitate to call:     (Prior to the day of surgery) [facility] department:  [phone]   (Day of surgery) Pre-Op department:  [phone]    These surgical instructions were reviewed with patient during the [facility] phone call.

## 2022-01-26 ENCOUNTER — ANESTHESIA EVENT (OUTPATIENT)
Dept: ENDOSCOPY | Age: 47
End: 2022-01-26
Payer: COMMERCIAL

## 2022-01-27 ENCOUNTER — ANESTHESIA (OUTPATIENT)
Dept: ENDOSCOPY | Age: 47
End: 2022-01-27
Payer: COMMERCIAL

## 2022-01-27 ENCOUNTER — HOSPITAL ENCOUNTER (OUTPATIENT)
Age: 47
Setting detail: OUTPATIENT SURGERY
Discharge: HOME | End: 2022-01-27
Attending: INTERNAL MEDICINE | Admitting: INTERNAL MEDICINE
Payer: COMMERCIAL

## 2022-01-27 VITALS
DIASTOLIC BLOOD PRESSURE: 83 MMHG | WEIGHT: 241.5 LBS | HEIGHT: 61 IN | TEMPERATURE: 98 F | OXYGEN SATURATION: 99 % | SYSTOLIC BLOOD PRESSURE: 121 MMHG | BODY MASS INDEX: 45.59 KG/M2 | RESPIRATION RATE: 14 BRPM | HEART RATE: 63 BPM

## 2022-01-27 PROCEDURE — 74011250636 HC RX REV CODE- 250/636: Performed by: NURSE ANESTHETIST, CERTIFIED REGISTERED

## 2022-01-27 PROCEDURE — 00813 ANES UPR LWR GI NDSC PX: CPT | Performed by: STUDENT IN AN ORGANIZED HEALTH CARE EDUCATION/TRAINING PROGRAM

## 2022-01-27 PROCEDURE — 88305 TISSUE EXAM BY PATHOLOGIST: CPT

## 2022-01-27 PROCEDURE — 77030008565 HC TBNG SUC IRR ERBE -B: Performed by: INTERNAL MEDICINE

## 2022-01-27 PROCEDURE — 76040000019: Performed by: INTERNAL MEDICINE

## 2022-01-27 PROCEDURE — 00813 ANES UPR LWR GI NDSC PX: CPT | Performed by: NURSE ANESTHETIST, CERTIFIED REGISTERED

## 2022-01-27 PROCEDURE — 77030019988 HC FCPS ENDOSC DISP BSC -B: Performed by: INTERNAL MEDICINE

## 2022-01-27 PROCEDURE — 74011000250 HC RX REV CODE- 250: Performed by: NURSE ANESTHETIST, CERTIFIED REGISTERED

## 2022-01-27 PROCEDURE — 77030021593 HC FCPS BIOP ENDOSC BSC -A: Performed by: INTERNAL MEDICINE

## 2022-01-27 PROCEDURE — 76060000031 HC ANESTHESIA FIRST 0.5 HR: Performed by: INTERNAL MEDICINE

## 2022-01-27 PROCEDURE — 2709999900 HC NON-CHARGEABLE SUPPLY: Performed by: INTERNAL MEDICINE

## 2022-01-27 RX ORDER — LIDOCAINE HYDROCHLORIDE 20 MG/ML
INJECTION, SOLUTION EPIDURAL; INFILTRATION; INTRACAUDAL; PERINEURAL AS NEEDED
Status: DISCONTINUED | OUTPATIENT
Start: 2022-01-27 | End: 2022-01-27 | Stop reason: HOSPADM

## 2022-01-27 RX ORDER — SODIUM CHLORIDE, SODIUM LACTATE, POTASSIUM CHLORIDE, CALCIUM CHLORIDE 600; 310; 30; 20 MG/100ML; MG/100ML; MG/100ML; MG/100ML
50 INJECTION, SOLUTION INTRAVENOUS CONTINUOUS
Status: DISCONTINUED | OUTPATIENT
Start: 2022-01-27 | End: 2022-01-27 | Stop reason: HOSPADM

## 2022-01-27 RX ORDER — SODIUM CHLORIDE 0.9 % (FLUSH) 0.9 %
5-40 SYRINGE (ML) INJECTION AS NEEDED
Status: DISCONTINUED | OUTPATIENT
Start: 2022-01-27 | End: 2022-01-27 | Stop reason: HOSPADM

## 2022-01-27 RX ORDER — SODIUM CHLORIDE 0.9 % (FLUSH) 0.9 %
5-40 SYRINGE (ML) INJECTION EVERY 8 HOURS
Status: DISCONTINUED | OUTPATIENT
Start: 2022-01-27 | End: 2022-01-27 | Stop reason: HOSPADM

## 2022-01-27 RX ORDER — SODIUM CHLORIDE, SODIUM LACTATE, POTASSIUM CHLORIDE, CALCIUM CHLORIDE 600; 310; 30; 20 MG/100ML; MG/100ML; MG/100ML; MG/100ML
INJECTION, SOLUTION INTRAVENOUS
Status: DISCONTINUED | OUTPATIENT
Start: 2022-01-27 | End: 2022-01-27 | Stop reason: HOSPADM

## 2022-01-27 RX ORDER — PROPOFOL 10 MG/ML
INJECTION, EMULSION INTRAVENOUS AS NEEDED
Status: DISCONTINUED | OUTPATIENT
Start: 2022-01-27 | End: 2022-01-27 | Stop reason: HOSPADM

## 2022-01-27 RX ADMIN — SODIUM CHLORIDE, SODIUM LACTATE, POTASSIUM CHLORIDE, AND CALCIUM CHLORIDE: 600; 310; 30; 20 INJECTION, SOLUTION INTRAVENOUS at 14:26

## 2022-01-27 RX ADMIN — PROPOFOL 20 MG: 10 INJECTION, EMULSION INTRAVENOUS at 14:51

## 2022-01-27 RX ADMIN — LIDOCAINE HYDROCHLORIDE 80 MG: 20 INJECTION, SOLUTION EPIDURAL; INFILTRATION; INTRACAUDAL; PERINEURAL at 14:37

## 2022-01-27 RX ADMIN — PROPOFOL 50 MG: 10 INJECTION, EMULSION INTRAVENOUS at 14:43

## 2022-01-27 RX ADMIN — PROPOFOL 20 MG: 10 INJECTION, EMULSION INTRAVENOUS at 14:54

## 2022-01-27 RX ADMIN — PROPOFOL 100 MG: 10 INJECTION, EMULSION INTRAVENOUS at 14:37

## 2022-01-27 RX ADMIN — PROPOFOL 20 MG: 10 INJECTION, EMULSION INTRAVENOUS at 14:52

## 2022-01-27 RX ADMIN — PROPOFOL 50 MG: 10 INJECTION, EMULSION INTRAVENOUS at 14:41

## 2022-01-27 RX ADMIN — PROPOFOL 20 MG: 10 INJECTION, EMULSION INTRAVENOUS at 14:50

## 2022-01-27 RX ADMIN — PROPOFOL 20 MG: 10 INJECTION, EMULSION INTRAVENOUS at 14:48

## 2022-01-27 RX ADMIN — PROPOFOL 20 MG: 10 INJECTION, EMULSION INTRAVENOUS at 14:49

## 2022-01-27 RX ADMIN — PROPOFOL 20 MG: 10 INJECTION, EMULSION INTRAVENOUS at 14:46

## 2022-01-27 RX ADMIN — PROPOFOL 20 MG: 10 INJECTION, EMULSION INTRAVENOUS at 14:53

## 2022-01-27 RX ADMIN — PROPOFOL 50 MG: 10 INJECTION, EMULSION INTRAVENOUS at 14:39

## 2022-01-27 RX ADMIN — PROPOFOL 20 MG: 10 INJECTION, EMULSION INTRAVENOUS at 14:45

## 2022-01-27 NOTE — PROGRESS NOTES
[email or web address]. [address]  [place], [address]      Brief Procedure Note    Tommy Lopez  1975  [id number]    Date of Procedure: 1/27/2022    Preoperative diagnosis: BMI 45.0 - 49.9, adult:   Z68.42  Abdominal pain:  R10.9  Constipation:  564.00 - K59.00  Bloating:  R14.0  Nausea and vomiting:  R11.2  Elevated erythrocyte sedimentation rate:  R70.0  Elevated C-reactive protein (CRP):  R79.82    Postoperative diagnosis: egd- Hiatal Hernia(39cm-40cm)  colon-ileitis, hemorrhoids    Type of Anesthesia: MAC (Monitored anesthesia care)    Description of findings: same as post op dx    Procedure: Procedure(s):  UPPER ENDOSCOPY/ Biopsy  COLONOSCOPY/biopsy    :  Dr. Pool Shen MD    Assistant(s): Endoscopy Technician-1: Renato Sweeney RN-1: Lesley Chan RN    Devices/implants/grafts/tissues/prosthesis: None    EBL:None    Specimens:   ID Type Source Tests Collected by Time Destination   1 : Duodenum bxs Preservative Duodenum  Cristofer Balderas MD 1/27/2022 1437 Pathology   2 : Antrum and body bxs Preservative Gastric  Cristofer Balderas MD 1/27/2022 1438 Pathology   3 : Esophageal bxs Preservative Esophagus  Cristofer Balderas MD 1/27/2022 1440 Pathology   4 : Terminal Ileum Bxs Preservative Ileum, Terminal  Cristofer Balderas MD 1/27/2022 1446 Pathology   5 : Random colon bxs Preservative Colon  Cristofer Balderas MD 1/27/2022 1448 Pathology       Findings: See printed and scanned procedure note    Complications: None    Dr. Pool Shen MD  1/27/2022  3:08 PM

## 2022-01-27 NOTE — PERIOP NOTES
[address] January 27, 2022       RE: Christy Mcgee      To Whom It May Concern,    This is to certify that Christy Mcgee may return to work on January 29, 2022. Please feel free to contact my office if you have any questions or concerns. Thank you for your assistance in this matter.       Sincerely,  Lolly Aranda RN, [address] [facility], [address] [facility]

## 2022-01-27 NOTE — ANESTHESIA POSTPROCEDURE EVALUATION
Procedure(s):  UPPER ENDOSCOPY/ Biopsy  COLONOSCOPY/biopsy.     MAC, general - backup    Anesthesia Post Evaluation      Multimodal analgesia: multimodal analgesia used between 6 hours prior to anesthesia start to PACU discharge  Patient location during evaluation: PACU  Patient participation: complete - patient participated  Level of consciousness: sleepy but conscious  Pain management: adequate  Airway patency: patent  Anesthetic complications: no  Cardiovascular status: acceptable  Respiratory status: acceptable  Hydration status: acceptable  Post anesthesia nausea and vomiting:  controlled  Final Post Anesthesia Temperature Assessment:  Normothermia (36.0-37.5 degrees C)      INITIAL Post-op Vital signs:   Vitals Value Taken Time   /60 01/27/22 1523   Temp 36.6 °C (97.8 °F) 01/27/22 1502   Pulse 65 01/27/22 1527   Resp 15 01/27/22 1527   SpO2 100 % 01/27/22 1528

## 2022-01-27 NOTE — DISCHARGE INSTRUCTIONS
Upper GI Endoscopy: What to Expect at [address] [place] [address]  After you have an endoscopy, you will stay at the hospital or clinic for 1 to 2 hours. This will allow the medicine to wear off. You will be able to go home after your doctor or nurse checks to make sure you are not having any problems. You may have to stay overnight if you had treatment during the test. You may have a sore throat for a day or two after the test.  This care sheet gives you a general idea about what to expect after the test.  How can you care for yourself at home? Activity  · Rest as much as you need to after you go home. · You should be able to go back to your usual activities the day after the test.  Diet  · Follow your doctor's directions for eating after the test.  · Drink plenty of fluids (unless your doctor has told you not to). Medications  · If you have a sore throat the day after the test, use an over-the-counter spray to numb your throat. Follow-up care is a key part of your treatment and safety. Be sure to make and go to all appointments, and call your doctor if you are having problems. It's also a good idea to know your test results and keep a list of the medicines you take. When should you call for help? Call 911 anytime you think you may need emergency care. For example, call if:  · You passed out (lost consciousness). · You cough up blood. · You vomit blood or what looks like coffee grounds. · You pass maroon or very bloody stools. Call your doctor now or seek immediate medical care if:  · You have trouble swallowing. · You have belly pain. · Your stools are black and tarlike or have streaks of blood. · You are sick to your stomach or cannot keep fluids down. Watch closely for changes in your health, and be sure to contact your doctor if:  · Your throat still hurts after a day or two. · You do not get better as expected. Where can you learn more?    Go to DealExplorer.be  Enter J454 in the search box to learn more about \"Upper GI Endoscopy: What to Expect at Home. \"   © 1417-8707 Healthwise, Incorporated. Care instructions adapted under license by [facility] (which disclaims liability or warranty for this information). This care instruction is for use with your licensed healthcare professional. If you have questions about a medical condition or this instruction, always ask your healthcare professional. [address]  any warranty or liability for your use of this information. Content Version: 52.8.504039; Current as of: November 14, 2014    Colonoscopy: What to Expect at [address]  After you have a colonoscopy, you will stay at the clinic for 1 to 2 hours until the medicines wear off. Then you can go home. But you will need to arrange for a ride. Your doctor will tell you when you can eat and do your other usual activities. Your doctor will talk to you about when you will need your next colonoscopy. Your doctor can help you decide how often you need to be checked. This will depend on the results of your test and your risk for colorectal cancer. After the test, you may be bloated or have gas pains. You may need to pass gas. If a biopsy was done or a polyp was removed, you may have streaks of blood in your stool (feces) for a few days. This care sheet gives you a general idea about how long it will take for you to recover. But each person recovers at a different pace. Follow the steps below to get better as quickly as possible. How can you care for yourself at home? Activity  · Rest when you feel tired. · You can do your normal activities when it feels okay to do so. Diet  · Follow your doctor's directions for eating. · Unless your doctor has told you not to, drink plenty of fluids. This helps to replace the fluids that were lost during the colon prep. · Do not drink alcohol.   Medicines  · If polyps were removed or a biopsy was done during the test, your doctor may tell you not to take aspirin or other anti-inflammatory medicines for a few days. These include ibuprofen (Advil, Motrin) and naproxen (Aleve). Other instructions  · For your safety, do not drive or operate machinery until the medicine wears off and you can think clearly. Your doctor may tell you not to drive or operate machinery until the day after your test.  · Do not sign legal documents or make major decisions until the medicine wears off and you can think clearly. The anesthesia can make it hard for you to fully understand what you are agreeing to. Follow-up care is a key part of your treatment and safety. Be sure to make and go to all appointments, and call your doctor if you are having problems. It's also a good idea to know your test results and keep a list of the medicines you take. When should you call for help? Call 911 anytime you think you may need emergency care. For example, call if:  · You passed out (lost consciousness). · You pass maroon or bloody stools. · You have severe belly pain. Call your doctor now or seek immediate medical care if:  · Your stools are black and tarlike. · Your stools have streaks of blood, but you did not have a biopsy or any polyps removed. · You have belly pain, or your belly is swollen and firm. · You vomit. · You have a fever. · You are very dizzy. Watch closely for changes in your health, and be sure to contact your doctor if you have any problems. Where can you learn more? Go to Railroad Empire.be  Enter E264 in the search box to learn more about \"Colonoscopy: What to Expect at Home. \"   © 1577-9391 Healthwise, Incorporated. Care instructions adapted under license by [facility] (which disclaims liability or warranty for this information).  This care instruction is for use with your licensed healthcare professional. If you have questions about a medical condition or this instruction, always ask your healthcare professional. Healthwise, Incorporated disclaims any warranty or liability for your use of this information. Content Version: 99.2.768613; Current as of: November 14, 2014      DISCHARGE SUMMARY from Nurse     POST-PROCEDURE INSTRUCTIONS:    Call your Physician if you:  ? Observe any excess bleeding. ? Develop a temperature over 100.5o F.  ? Experience abdominal, shoulder or chest pain. ? Notice any signs of decreased circulation or nerve impairment to an extremity such as a change in color, persistent numbness, tingling, coldness or increase in pain. ? Vomit blood or you have nausea and vomiting lasting longer than 4 hours. ? Are unable to take medications. ? Are unable to urinate within 8 hours after discharge following general anesthesia or intravenous sedation. For the next 24 hours after receiving general anesthesia or intravenous sedation, or while taking prescription Narcotics, limit your activities:  ? Do NOT drive a motor vehicle, operate hazard machinery or power tools, or perform tasks that require coordination. The medication you received during your procedure may have some effect on your mental awareness. ? Do NOT make important personal or business decisions. The medication you received during your procedure may have some effect on your mental awareness. ? Do NOT drink alcoholic beverages. These drinks do not mix well with the medications that have been given to you. ? Upon discharge from the hospital, you must be accompanied by a responsible adult. ? Resume your diet as directed by your physician. ? Resume medications as your physician has prescribed. ? Please give a list of your current medications to your Primary Care Provider. ? Please update this list whenever your medications are discontinued, doses are changed, or new medications (including over-the-counter products) are added. ? Please carry medication information at all times in case of emergency situations.       These are general instructions for a healthy lifestyle:    No smoking/ No tobacco products/ Avoid exposure to second hand smoke.  Surgeon General's Warning:  Quitting smoking now greatly reduces serious risk to your health. Obesity, smoking, and a sedentary lifestyle greatly increase your risk for illness.  A healthy diet, regular physical exercise & weight monitoring are important for maintaining a healthy lifestyle   You may be retaining fluid if you have a history of heart failure or if you experience any of the following symptoms:  Weight gain of 3 pounds or more overnight or 5 pounds in a week, increased swelling in our hands or feet or shortness of breath while lying flat in bed. Please call your doctor as soon as you notice any of these symptoms; do not wait until your next office visit. Colorectal Screening   Colorectal cancer almost always develops from precancerous polyps (abnormal growths) in the colon or rectum. Screening tests can find precancerous polyps, so that they can be removed before they turn into cancer. Screening tests can also find colorectal cancer early, when treatment works best.  [facility] Speak with your physician about when you should begin screening and how often you should be tested. Additional Information    Educational references and/or instructions provided during this visit included:    See attached. APPOINTMENTS:    Per MD Instruction. Discharge information has been reviewed with the patient. The patient verbalized understanding. Patient Education   Patient Education        Hemorrhoids: Care Instructions  Overview     Hemorrhoids are swollen veins that develop in the anal canal. Bleeding during bowel movements, itching, and rectal pain are the most common symptoms. Hemorrhoids can be uncomfortable at times, but rarely are they a serious problem. Most of the time, you can treat them with simple changes to your diet and bowel habits.  These changes include eating more fiber and not straining to pass stools. Most hemorrhoids don't need surgery or other treatment unless they are very large and painful or bleed a lot. Follow-up care is a key part of your treatment and safety. Be sure to make and go to all appointments, and call your doctor if you are having problems. It's also a good idea to know your test results and keep a list of the medicines you take. How can you care for yourself at home? · Sit in a few inches of warm water (sitz bath) 3 times a day and after bowel movements. The warm water helps with pain and itching. · Put ice on your anal area several times a day for 10 minutes at a time. Put a thin cloth between the ice and your skin. Follow this by placing a warm, wet towel on the area for another 10 to 20 minutes. · Take pain medicines exactly as directed. ? If the doctor gave you a prescription medicine for pain, take it as prescribed. ? If you are not taking a prescription pain medicine, ask your doctor if you can take an over-the-counter medicine. · Keep the anal area clean, but be gentle. Use water and a fragrance-free soap, or use baby wipes or medicated pads such as Tucks. · Wear cotton underwear and loose clothing to decrease moisture in the anal area. · Eat more fiber. Include foods such as whole-grain breads and cereals, raw vegetables, raw and dried fruits, and beans. · Drink plenty of fluids. If you have kidney, heart, or liver disease and have to limit fluids, talk with your doctor before you increase the amount of fluids you drink. · Use a stool softener that contains bran or psyllium. You can save money by buying bran or psyllium (available in bulk at most health food stores) and sprinkling it on foods or stirring it into fruit juice. Or you can use a product such as Metamucil or Hydrocil. · Practice healthy bowel habits. ? Go to the bathroom as soon as you have the urge. ? Avoid straining to pass stools.  Relax and give yourself time to let things happen naturally. ? Do not hold your breath while passing stools. ? Do not read while sitting on the toilet. Get off the toilet as soon as you have finished. · Take your medicines exactly as prescribed. Call your doctor if you think you are having a problem with your medicine. When should you call for help? Call 911 anytime you think you may need emergency care. For example, call if:    · You pass maroon or very bloody stools. Call your doctor now or seek immediate medical care if:    · You have increased pain.     · You have increased bleeding. Watch closely for changes in your health, and be sure to contact your doctor if:    · Your symptoms have not improved after 3 or 4 days. Where can you learn more? Go to http://linnea-luz.info/  Enter F228 in the search box to learn more about \"Hemorrhoids: Care Instructions. \"  Current as of: February 10, 2021               Content Version: 13.0  © 2006-2021 [employer]. Care instructions adapted under license by [employer] (which disclaims liability or warranty for this information). If you have questions about a medical condition or this instruction, always ask your healthcare professional. [place] [address] any warranty or liability for your use of this information. Hiatal Hernia: Care Instructions  Your Care Instructions  A hiatal hernia occurs when part of the stomach bulges into the chest cavity. A hiatal hernia may allow stomach acid and juices to back up into the esophagus (acid reflux). This can cause a feeling of burning, warmth, heat, or pain behind the breastbone. This feeling may often occur after you eat, soon after you lie down, or when you bend forward, and it may come and go. You also may have a sour taste in your mouth. These symptoms are commonly known as heartburn or reflux. But not all hiatal hernias cause symptoms.   Follow-up care is a key part of your treatment and safety. Be sure to make and go to all appointments, and call your doctor if you are having problems. It's also a good idea to know your test results and keep a list of the medicines you take. How can you care for yourself at home? · Take your medicines exactly as prescribed. Call your doctor if you think you are having a problem with your medicine. · Do not take aspirin or other nonsteroidal anti-inflammatory drugs (NSAIDs), such as ibuprofen (Advil, Motrin) or naproxen (Aleve), unless your doctor says it is okay. Ask your doctor what you can take for pain. · Your doctor may recommend over-the-counter medicine. For mild or occasional indigestion, antacids such as Tums, Gaviscon, Maalox, or Mylanta may help. Your doctor also may recommend over-the-counter acid reducers, such as famotidine (Pepcid AC), cimetidine (Tagamet HB), or omeprazole (Prilosec). Read and follow all instructions on the label. If you use these medicines often, talk with your doctor. · Change your eating habits. ? It's best to eat several small meals instead of two or three large meals. ? After you eat, wait 2 to 3 hours before you lie down. Late-night snacks aren't a good idea. ? Chocolate, mint, and alcohol can make heartburn worse. They relax the valve between the esophagus and the stomach. ? Spicy foods, foods that have a lot of acid (like tomatoes and oranges), and coffee can make heartburn symptoms worse in some people. If your symptoms are worse after you eat a certain food, you may want to stop eating that food to see if your symptoms get better. · Do not smoke or chew tobacco.  · If you get heartburn at night, raise the head of your bed 6 to 8 inches by putting the frame on blocks or placing a foam wedge under the head of your mattress. (Adding extra pillows does not work.)  · Do not wear tight clothing around your middle. · Lose weight if you need to. Losing just 5 to 10 pounds can help. When should you call for help? Call your doctor now or seek immediate medical care if:    · You have new or worse belly pain.     · You are vomiting. Watch closely for changes in your health, and be sure to contact your doctor if:    · You have new or worse symptoms of indigestion.     · You have trouble or pain swallowing.     · You are losing weight.     · You do not get better as expected. Where can you learn more? Go to http://www.parker.com/  Enter T074 in the search box to learn more about \"Hiatal Hernia: Care Instructions. \"  Current as of: February 10, 2021               Content Version: 13.0  © 0001-0888 [employer]. Care instructions adapted under license by [employer] (which disclaims liability or warranty for this information). If you have questions about a medical condition or this instruction, always ask your healthcare professional. [address] any warranty or liability for your use of this information.

## 2022-01-27 NOTE — ANESTHESIA PREPROCEDURE EVALUATION
Relevant Problems   CARDIOVASCULAR   (+) Essential hypertension      ENDOCRINE   (+) Obesity, morbid (HCC)       Anesthetic History   No history of anesthetic complications            Review of Systems / Medical History  Patient summary reviewed, nursing notes reviewed and pertinent labs reviewed    Pulmonary            Asthma : well controlled    Comments: No recent asthma exacerbations, however takes albuterol daily independent of symptoms   Neuro/Psych   Within defined limits           Cardiovascular    Hypertension: well controlled                Comments: 2018 TTE during w/u for palpitations showed DHEERAJ, no hx of syncope or chest pain, and has tolerated geta without issues   GI/Hepatic/Renal  Within defined limits              Endo/Other        Morbid obesity     Other Findings              Physical Exam    Airway  Mallampati: II  TM Distance: 4 - 6 cm  Neck ROM: normal range of motion   Mouth opening: Normal     Cardiovascular    Rhythm: regular  Rate: normal         Dental  No notable dental hx       Pulmonary  Breath sounds clear to auscultation               Abdominal  GI exam deferred       Other Findings            Anesthetic Plan    ASA: 2  Anesthesia type: MAC and general - backup          Induction: Intravenous  Anesthetic plan and risks discussed with: Patient

## 2022-01-27 NOTE — H&P
[email or web address]  [phone]    GASTROENTEROLOGY Pre-Procedure H and P      Impression/Plan:   1. This patient is consented for an EGD and colonoscopy for Elevated ESR/CRP; abdominal pain      Chief Complaint: Elevated ESR/CRP; abdominal pain  HPI:  Norma Alba is a 42 y.o. female who is being is having an EGD and colonoscopy for Elevated ESR/CRP; abdominal pain  PMH:   Past Medical History:   Diagnosis Date    Abdominal cramping, periumbilical     Asthma     Back pain     Bilateral plantar fasciitis     Endometriosis     Hypertension 2018    Morbid obesity ([address]     Vertigo        PSH:   Past Surgical History:   Procedure Laterality Date    HX ABDOMINAL LAPAROSCOPY      times 2 for dermoid cyst, enomertial ablasion    HX  SECTION  2009    HX PARTIAL HYSTERECTOMY  2017       Social HX:   Social History     Socioeconomic History    Marital status:      Spouse name: Not on file    Number of children: Not on file    Years of education: Not on file    Highest education level: Not on file   Occupational History    Not on file   Tobacco Use    Smoking status: Former Smoker     Quit date: 2003     Years since quittin.2    Smokeless tobacco: Never Used   Vaping Use    Vaping Use: Former   Substance and Sexual Activity    Alcohol use: Yes     Comment: socially    Drug use: Never    Sexual activity: Not on file   Other Topics Concern    Not on file   Social History Narrative    Not on file     Social Determinants of Health     Financial Resource Strain:     Difficulty of Paying Living Expenses: Not on file   Food Insecurity:     Worried About [address] in the Last Year: Not on file    [address] in the Last Year: Not on file   Transportation Needs:     Lack of Transportation (Medical): Not on file    Lack of Transportation (Non-Medical):  Not on file   Physical Activity:     Days of Exercise per Week: Not on file    Minutes of Exercise per Session: Not on file   Stress:     Feeling of Stress : Not on file   Social Connections:     Frequency of Communication with Friends and Family: Not on file    Frequency of Social Gatherings with Friends and Family: Not on file    Attends Yazdanism Services: Not on file    Active Member of Clubs or Organizations: Not on file    Attends Club or Organization Meetings: Not on file    Marital Status: Not on file   Intimate Partner Violence:     Fear of Current or Ex-Partner: Not on file    Emotionally Abused: Not on file    Physically Abused: Not on file    Sexually Abused: Not on file   Housing Stability:     Unable to Pay for Housing in the Last Year: Not on file    Number of [place] in the Last Year: Not on file    Unstable Housing in the Last Year: Not on file       FHX:   Family History   Problem Relation Age of Onset    Breast Cancer Sister 28    Cancer Sister         breast and uterine    Diabetes Mother     Hypertension Mother     Cancer Maternal Uncle         lung and bone     Cancer Maternal Grandmother         breast    Breast Cancer Maternal Grandmother     Cancer Cousin         breast    Breast Cancer Cousin        Allergy:   Allergies   Allergen Reactions    Zegerid [Omeprazole-Sodium Bicarbonate] Swelling     Hands and feet       Home Medications:     Medications Prior to Admission   Medication Sig    triamterene-hydroCHLOROthiazide (MAXZIDE) 37.5-25 mg per tablet Take  by mouth daily.  fexofenadine (ALLEGRA) 180 mg tablet Take  by mouth.  metoprolol succinate (TOPROL-XL) 25 mg XL tablet Take  by mouth daily.  armodafiniL (NuvigiL) 250 mg tab tablet Take  by mouth.  cyanocobalamin (VITAMIN B12) 500 mcg tablet Take 500 mcg by mouth daily.  omeprazole (PRILOSEC) 40 mg capsule Take 40 mg by mouth daily.  ergocalciferol (Vitamin D2) 1,250 mcg (50,000 unit) capsule Take 50,000 Units by mouth every seven (7) days.     multivitamin (ONE A DAY) tablet Take 1 Tablet by mouth daily.    TURMERIC PO Take  by mouth daily.  albuterol (PROVENTIL HFA, VENTOLIN HFA, PROAIR HFA) 90 mcg/actuation inhaler Take 2 Puffs by inhalation every four (4) hours as needed for Wheezing.  predniSONE (STERAPRED) 5 mg dose pack Take  by mouth See Admin Instructions. See administration instruction per 5mg dose pack    lisinopril-hydroCHLOROthiazide (PRINZIDE, ZESTORETIC) 20-25 mg per tablet TK 1 T PO QD    ibuprofen (MOTRIN) 800 mg tablet Take 1 Tab by mouth every eight (8) hours as needed for Pain.  montelukast (SINGULAIR) 10 mg tablet Take 10 mg by mouth nightly.  meclizine (ANTIVERT) 25 mg tablet Take 25 mg by mouth three (3) times daily as needed. Review of Systems:     Constitutional: No fevers, chills, weight loss, fatigue. Skin: No rashes, pruritis, jaundice, ulcerations, erythema. HENT: No headaches, nosebleeds, sinus pressure, rhinorrhea, sore throat. Eyes: No visual changes, blurred vision, eye pain, photophobia, jaundice. Cardiovascular: No chest pain, heart palpitations. Respiratory: No cough, SOB, wheezing, chest discomfort, orthopnea. Gastrointestinal: Neg unless noted otherwise in H&P   Genitourinary: No dysuria, bleeding, discharge, pyuria. Musculoskeletal: No weakness, arthralgias, wasting. Endo: No sweats. Heme: No bruising, easy bleeding. Allergies: As noted. Neurological: Cranial nerves intact. Alert and oriented. Gait not assessed. Psychiatric:  No anxiety, depression, hallucinations. Visit Vitals  /79 (BP 1 Location: Left arm, BP Patient Position: At rest)   Pulse 72   Temp 98.7 °F (37.1 °C)   Resp 20   Ht 5' 1\" (1.549 m)   Wt 109.5 kg (241 lb 8 oz)   LMP 10/02/2017 (Exact Date)   SpO2 97%   BMI 45.63 kg/m²       Physical Assessment:     constitutional: appearance: well developed, well nourished, normal habitus, no deformities, in no acute distress.    skin: inspection: no rashes, ulcers, icterus or other lesions; no clubbing or telangiectasias. palpation: no induration or subcutaneos nodules. eyes: inspection: normal conjunctivae and lids; no jaundice pupils: normal  ENMT: mouth: normal oral mucosa,lips and gums; good dentition. oropharynx: normal tongue, hard and soft palate; posterior pharynx without erithema, exudate or lesions. neck: thyroid: normal size, consistency and position; no masses or tenderness. respiratory: effort: normal chest excursion; no intercostal retraction or accessory muscle use. cardiovascular: abdominal aorta: normal size and position; no bruits. palpation: PMI of normal size and position; normal rhythm; no thrill or murmurs. abdominal: abdomen: normal consistency; no tenderness or masses. hernias: no hernias appreciated. liver: normal size and consistency. spleen: not palpable. rectal: hemoccult/guaiac: not performed. musculoskeletal: digits and nails: no clubbing, cyanosis, petechiae or other inflammatory conditions. gait: normal gait and station head and neck: normal range of motion; no pain, crepitation or contracture. spine/ribs/pelvis: normal range of motion; no pain, deformity or contracture. neurologic: cranial nerves: II-XII normal.   psychiatric: judgement/insight: within normal limits. memory: within normal limits for recent and remote events. mood and affect: no evidence of depression, anxiety or agitation. orientation: oriented to time, space and person. Basic Metabolic Profile   No results for input(s): NA, K, CL, CO2, BUN, GLU, CA, MG, PHOS in the last 72 hours. No lab exists for component: CREAT      CBC w/Diff    No results for input(s): WBC, RBC, HGB, HCT, MCV, MCH, MCHC, RDW, PLT, HGBEXT, HCTEXT, PLTEXT in the last 72 hours. No lab exists for component: MPV No results for input(s): GRANS, LYMPH, EOS, PRO, MYELO, METAS, BLAST in the last 72 hours.     No lab exists for component: MONO, BASO     Hepatic Function   No results for input(s): ALB, TP, TBILI, AP, AML, LPSE in the last 72 hours. No lab exists for component: DBILI, GPT, SGOT     Coags   No results for input(s): PTP, INR, APTT, INREXT in the last 72 hours. Huy Peterson MD  [facility] [address], [facility]  Cell: [phone]  Direct pager: [phone]  [email or web address]  [email or web address]

## 2022-03-19 PROBLEM — R00.2 PALPITATIONS: Status: ACTIVE | Noted: 2018-05-03

## 2022-03-19 PROBLEM — I10 ESSENTIAL HYPERTENSION: Status: ACTIVE | Noted: 2018-05-03

## 2022-03-20 PROBLEM — E66.01 OBESITY, MORBID (HCC): Status: ACTIVE | Noted: 2018-04-13

## 2023-02-02 DIAGNOSIS — R00.0 TACHYCARDIA: Primary | ICD-10-CM

## 2023-05-04 ENCOUNTER — TRANSCRIBE ORDERS (OUTPATIENT)
Facility: HOSPITAL | Age: 48
End: 2023-05-04

## 2023-05-04 DIAGNOSIS — Z12.39 ENCOUNTER FOR OTHER SCREENING FOR MALIGNANT NEOPLASM OF BREAST: Primary | ICD-10-CM

## 2023-06-10 ENCOUNTER — HOSPITAL ENCOUNTER (OUTPATIENT)
Facility: HOSPITAL | Age: 48
End: 2023-06-10
Payer: COMMERCIAL

## 2023-06-10 DIAGNOSIS — Z12.39 ENCOUNTER FOR OTHER SCREENING FOR MALIGNANT NEOPLASM OF BREAST: ICD-10-CM

## 2023-06-10 PROCEDURE — 77063 BREAST TOMOSYNTHESIS BI: CPT

## 2024-03-29 ENCOUNTER — HOSPITAL ENCOUNTER (OUTPATIENT)
Facility: HOSPITAL | Age: 49
Discharge: HOME | End: 2024-04-01

## 2024-03-29 LAB — SENTARA SPECIMEN COLLECTION: NORMAL

## 2024-03-29 PROCEDURE — 99001 SPECIMEN HANDLING PT-LAB: CPT

## 2024-04-15 LAB — SPECIMEN FOR PATHOLOGY - OTHER: NORMAL

## 2025-02-08 ENCOUNTER — HOSPITAL ENCOUNTER (OUTPATIENT)
Facility: HOSPITAL | Age: 50
Discharge: HOME | End: 2025-02-11
Attending: FAMILY MEDICINE
Payer: COMMERCIAL

## 2025-02-08 DIAGNOSIS — Z12.31 VISIT FOR SCREENING MAMMOGRAM: ICD-10-CM

## 2025-02-08 PROCEDURE — 77063 BREAST TOMOSYNTHESIS BI: CPT

## 2025-03-26 ENCOUNTER — TELEPHONE (OUTPATIENT)
Age: 50
End: 2025-03-26

## 2025-03-26 NOTE — TELEPHONE ENCOUNTER
[facility] Oncology Services  Breast & Colorectal Oncology Nurse Navigator Encounter    Name: Juwan Pryor  Age: 49 y.o.  : 1975    Encounter type:  [x]Initial Navigator Encounter  []Patient Initiated  []Navigator Follow-up  []Other:     Narrative:     Date/Time:      3/26/25 at 1:34pm  Attempted to reach patient by telephone regarding high risk screening inquiry. Left HIPPA compliant message requesting a return call. Will attempt to reach patient again.        Interdisciplinary Team:    Nurse Navigator: PHILL Olvera BSN, RN  Breast & Colorectal Cancer Nurse Navigator    [facility] Oncology [facility]  [address] [place] [address]  Office number [phone]  Cell number [phone]  [email or web address]  Good Help to Those in Need®

## 2025-03-27 ENCOUNTER — TELEPHONE (OUTPATIENT)
Age: 50
End: 2025-03-27

## 2025-03-27 NOTE — TELEPHONE ENCOUNTER
[facility] Oncology Services  Breast & Colorectal Oncology Nurse Navigator Encounter    Name: Juwan Pryor  Age: 49 y.o.  : 1975      Encounter type:  []Initial Navigator Encounter  [x]Patient Initiated  [x]Navigator Follow-up  []Other:     Narrative:   Call placed for inquiry of high risk breast cancer screening. Patient interested and amendable with plan. Someone from our office will call for an appointment with JORDON Mariano.           Interdisciplinary Team:    Nurse Navigator: PHILL Olvera BSN, RN  Breast & Colorectal Cancer Nurse Navigator    [facility] Oncology [facility]  [address] [place] [address]  Office number [phone]  Cell number [phone]  [email or web address]  Good Help to Those in Need®

## (undated) DEVICE — MEDI-VAC SUCTION HIGH CAPACITY: Brand: CARDINAL HEALTH

## (undated) DEVICE — FLEX ADVANTAGE 3000CC: Brand: FLEX ADVANTAGE

## (undated) DEVICE — AIRLIFE™ NASAL OXYGEN CANNULA CURVED, FLARED TIP WITH 14 FOOT (4.3 M) CRUSH-RESISTANT TUBING, OVER-THE-EAR STYLE: Brand: AIRLIFE™

## (undated) DEVICE — CANNULA NSL AD TBNG L14FT STD PVC O2 CRV CONN NONFLARED NSL

## (undated) DEVICE — GAUZE,SPONGE,4"X4",16PLY,STRL,LF,10/TRAY: Brand: MEDLINE

## (undated) DEVICE — BASIN EMESIS 500CC ROSE 250/CS 60/PLT: Brand: MEDEGEN MEDICAL PRODUCTS, LLC

## (undated) DEVICE — CANNULA ORIG TL CLR W FOAM CUSHIONS AND 14FT SUPL TB 3 CHN

## (undated) DEVICE — SOLUTION IRRIG 1000ML H2O STRL BLT

## (undated) DEVICE — GOWN ISOL IMPERV UNIV, DISP, OPEN BACK, BLUE --

## (undated) DEVICE — FLUFF AND POLYMER UNDERPAD,EXTRA HEAVY: Brand: WINGS

## (undated) DEVICE — CATHETER SUCT TR FL TIP 14FR W/ O CTRL

## (undated) DEVICE — MEDI-VAC NON-CONDUCTIVE SUCTION TUBING: Brand: CARDINAL HEALTH

## (undated) DEVICE — BITE BLOCK ENDOSCP UNIV AD 6 TO 9.4 MM

## (undated) DEVICE — FORCEPS BX L240CM JAW DIA2.8MM L CAP W/ NDL MIC MESH TOOTH

## (undated) DEVICE — ENDOSCOPY PUMP TUBING/ CAP SET: Brand: ERBE

## (undated) DEVICE — FCPS RAD JAW 4LC 240CM W/NDL -- BX/20 RADIAL JAW 4